# Patient Record
Sex: FEMALE | Race: WHITE | Employment: FULL TIME | URBAN - METROPOLITAN AREA
[De-identification: names, ages, dates, MRNs, and addresses within clinical notes are randomized per-mention and may not be internally consistent; named-entity substitution may affect disease eponyms.]

---

## 2017-07-20 PROCEDURE — 83876 ASSAY MYELOPEROXIDASE: CPT | Performed by: INTERNAL MEDICINE

## 2017-07-20 PROCEDURE — 86162 COMPLEMENT TOTAL (CH50): CPT | Performed by: INTERNAL MEDICINE

## 2017-07-20 PROCEDURE — 86160 COMPLEMENT ANTIGEN: CPT | Performed by: INTERNAL MEDICINE

## 2017-09-18 ENCOUNTER — LAB ENCOUNTER (OUTPATIENT)
Dept: LAB | Age: 57
End: 2017-09-18
Attending: ALLERGY & IMMUNOLOGY
Payer: COMMERCIAL

## 2017-09-18 DIAGNOSIS — E11.9 DIABETES MELLITUS (HCC): Primary | ICD-10-CM

## 2017-09-18 DIAGNOSIS — T78.40XA ALLERGIC: ICD-10-CM

## 2017-09-18 LAB
ALBUMIN SERPL-MCNC: 3.8 G/DL (ref 3.5–4.8)
ALP LIVER SERPL-CCNC: 96 U/L (ref 46–118)
ALT SERPL-CCNC: 34 U/L (ref 14–54)
AST SERPL-CCNC: 32 U/L (ref 15–41)
BILIRUB SERPL-MCNC: 0.6 MG/DL (ref 0.1–2)
BUN BLD-MCNC: 24 MG/DL (ref 8–20)
CALCIUM BLD-MCNC: 9 MG/DL (ref 8.3–10.3)
CHLORIDE: 106 MMOL/L (ref 101–111)
CHOLEST SMN-MCNC: 180 MG/DL (ref ?–200)
CO2: 25 MMOL/L (ref 22–32)
CREAT BLD-MCNC: 0.74 MG/DL (ref 0.55–1.02)
CREAT UR-SCNC: 233 MG/DL
GLUCOSE BLD-MCNC: 123 MG/DL (ref 70–99)
HDLC SERPL-MCNC: 46 MG/DL (ref 45–?)
HDLC SERPL: 3.91 {RATIO} (ref ?–4.44)
IMMUNOGLOBULIN E: 129 IU/ML (ref 3.6–114)
LDLC SERPL CALC-MCNC: 95 MG/DL (ref ?–130)
LDLC SERPL-MCNC: 39 MG/DL (ref 5–40)
M PROTEIN MFR SERPL ELPH: 7.5 G/DL (ref 6.1–8.3)
MICROALBUMIN UR-MCNC: 1.72 MG/DL
MICROALBUMIN/CREAT 24H UR-RTO: 7.4 UG/MG (ref ?–30)
NONHDLC SERPL-MCNC: 134 MG/DL (ref ?–130)
POTASSIUM SERPL-SCNC: 3.8 MMOL/L (ref 3.6–5.1)
SODIUM SERPL-SCNC: 141 MMOL/L (ref 136–144)
TRIGLYCERIDES: 195 MG/DL (ref ?–150)

## 2017-09-18 PROCEDURE — 86003 ALLG SPEC IGE CRUDE XTRC EA: CPT

## 2017-09-18 PROCEDURE — 82043 UR ALBUMIN QUANTITATIVE: CPT

## 2017-09-18 PROCEDURE — 80053 COMPREHEN METABOLIC PANEL: CPT

## 2017-09-18 PROCEDURE — 82785 ASSAY OF IGE: CPT

## 2017-09-18 PROCEDURE — 80061 LIPID PANEL: CPT

## 2017-09-18 PROCEDURE — 82570 ASSAY OF URINE CREATININE: CPT

## 2017-09-19 LAB
Lab: <0.35 KU/L
Lab: <0.35 KU/L

## 2017-09-20 LAB
ALLERGEN,  IGE: <0.1 KU/L
ALLERGEN,  TREE IGE: <0.1 KU/L
ALLERGEN, A.ALTERNATA(TENUIS): <0.1 KU/L
ALLERGEN, AMERICAN COCKROACH: <0.1 KU/L
ALLERGEN, ANIMAL, GUINEA PIG EPI IGE: 0.81 KU/L
ALLERGEN, BOX ELDER/MAPLE IGE: <0.1 KU/L
ALLERGEN, CAT DANDER IGE: 35.3 KU/L
ALLERGEN, CHICKEN FEATHERS IGE: <0.1 KU/L
ALLERGEN, COTTONWOOD IGE: <0.1 KU/L
ALLERGEN, D. FARINAE IGE: <0.1 KU/L
ALLERGEN, D.PTERONYSSINUS IGE: <0.1 KU/L
ALLERGEN, DOG DANDER IGE: 4.2 KU/L
ALLERGEN, GIANT RAGWEED IGE: <0.1 KU/L
ALLERGEN, GOOSE FEATHERS IGE: <0.1 KU/L
ALLERGEN, HAMSTER EPIT IGE: <0.1 KU/L
ALLERGEN, HONEYBEE VENOM IGE: <0.1 KU/L
ALLERGEN, HORMODENDRUM IGE: <0.1 KU/L
ALLERGEN, JUNE/KENTUCKY GRASS: <0.1 KU/L
ALLERGEN, MUCOR RACEMOSUS IGE: <0.1 KU/L
ALLERGEN, OAK TREE IGE: <0.1 KU/L
ALLERGEN, PAPER WASP VENOM IGE: <0.1 KU/L
ALLERGEN, PENICILLIUM NOTATUM: <0.1 KU/L
ALLERGEN, RABBIT EPITH IGE: 0.21 KU/L
ALLERGEN, RUSSIAN THISTLE IGE: <0.1 KU/L
ALLERGEN, SHORT RAGWEED IGE: <0.1 KU/L
ALLERGEN, TIMOTHY GRASS IGE: <0.1 KU/L
ALLERGEN, WHITE-FACED HORNET: <0.1 KU/L
ALLERGEN, YELLOW JACKET VENOM: <0.1 KU/L
ALLERGEN, YELLOW-FACED HORNET: <0.1 KU/L
ALLERGEN,ASPERGILLUS FUMIGATUS: <0.1 KU/L
COCKLEBUR,IGE: <0.1 KU/L
HORSE DANDER, IGE: 1.84 KU/L
Lab: 0.11 KU/L
Lab: <0.1 KU/L

## 2017-09-21 LAB — ALLERGEN, PIGWEED IGE: <0.1 KU/L

## 2017-09-27 LAB — ALLERGEN, DUCK FEATHER IGE: <0.1 KU/L

## 2018-03-17 PROCEDURE — 81001 URINALYSIS AUTO W/SCOPE: CPT | Performed by: FAMILY MEDICINE

## 2018-08-31 PROCEDURE — 81001 URINALYSIS AUTO W/SCOPE: CPT | Performed by: FAMILY MEDICINE

## 2019-09-26 PROCEDURE — 81001 URINALYSIS AUTO W/SCOPE: CPT | Performed by: FAMILY MEDICINE

## 2021-03-02 ENCOUNTER — APPOINTMENT (OUTPATIENT)
Dept: CT IMAGING | Age: 61
End: 2021-03-02
Attending: INTERNAL MEDICINE

## 2024-07-09 ENCOUNTER — OFF PREMISE (OUTPATIENT)
Dept: ADMINISTRATIVE | Age: 64
End: 2024-07-09

## 2024-07-10 RX ORDER — CETIRIZINE HYDROCHLORIDE 5 MG/1
5 TABLET ORAL DAILY
COMMUNITY

## 2024-07-10 RX ORDER — LEVOTHYROXINE SODIUM 0.2 MG/1
300 TABLET ORAL
COMMUNITY

## 2024-07-15 ENCOUNTER — HOSPITAL ENCOUNTER (OUTPATIENT)
Dept: PHYSICAL THERAPY | Facility: HOSPITAL | Age: 64
Discharge: HOME | End: 2024-07-15
Attending: ORTHOPAEDIC SURGERY
Payer: COMMERCIAL

## 2024-07-15 ENCOUNTER — LABORATORY ENCOUNTER (OUTPATIENT)
Dept: LAB | Age: 64
End: 2024-07-15
Attending: ORTHOPAEDIC SURGERY
Payer: COMMERCIAL

## 2024-07-15 DIAGNOSIS — M17.12 PRIMARY OSTEOARTHRITIS OF LEFT KNEE: ICD-10-CM

## 2024-07-15 LAB
ANION GAP SERPL CALC-SCNC: 9 MMOL/L (ref 0–18)
ANTIBODY SCREEN: NEGATIVE
BUN BLD-MCNC: 12 MG/DL (ref 9–23)
BUN/CREAT SERPL: 14.5 (ref 10–20)
CALCIUM BLD-MCNC: 9.8 MG/DL (ref 8.7–10.4)
CHLORIDE SERPL-SCNC: 106 MMOL/L (ref 98–112)
CO2 SERPL-SCNC: 28 MMOL/L (ref 21–32)
CREAT BLD-MCNC: 0.83 MG/DL
DEPRECATED RDW RBC AUTO: 50.7 FL (ref 35.1–46.3)
EGFRCR SERPLBLD CKD-EPI 2021: 79 ML/MIN/1.73M2 (ref 60–?)
ERYTHROCYTE [DISTWIDTH] IN BLOOD BY AUTOMATED COUNT: 16.7 % (ref 11–15)
FASTING STATUS PATIENT QL REPORTED: YES
GLUCOSE BLD-MCNC: 168 MG/DL (ref 70–99)
HCT VFR BLD AUTO: 36.6 %
HGB BLD-MCNC: 11.5 G/DL
MCH RBC QN AUTO: 26.1 PG (ref 26–34)
MCHC RBC AUTO-ENTMCNC: 31.4 G/DL (ref 31–37)
MCV RBC AUTO: 83.2 FL
OSMOLALITY SERPL CALC.SUM OF ELEC: 300 MOSM/KG (ref 275–295)
PLATELET # BLD AUTO: 254 10(3)UL (ref 150–450)
POTASSIUM SERPL-SCNC: 3.8 MMOL/L (ref 3.5–5.1)
RBC # BLD AUTO: 4.4 X10(6)UL
RH BLOOD TYPE: POSITIVE
SODIUM SERPL-SCNC: 143 MMOL/L (ref 136–145)
WBC # BLD AUTO: 9.6 X10(3) UL (ref 4–11)

## 2024-07-15 PROCEDURE — 86900 BLOOD TYPING SEROLOGIC ABO: CPT

## 2024-07-15 PROCEDURE — 36415 COLL VENOUS BLD VENIPUNCTURE: CPT

## 2024-07-15 PROCEDURE — 87081 CULTURE SCREEN ONLY: CPT

## 2024-07-15 PROCEDURE — 80048 BASIC METABOLIC PNL TOTAL CA: CPT

## 2024-07-15 PROCEDURE — 86901 BLOOD TYPING SEROLOGIC RH(D): CPT

## 2024-07-15 PROCEDURE — 86850 RBC ANTIBODY SCREEN: CPT

## 2024-07-15 PROCEDURE — 85027 COMPLETE CBC AUTOMATED: CPT

## 2024-07-15 PROCEDURE — 87147 CULTURE TYPE IMMUNOLOGIC: CPT

## 2024-07-31 NOTE — H&P (VIEW-ONLY)
Patient ID: Tosha Dunham     Chief Complaint:    Patient presents with:  Left Knee - Pre-Op       HPI:    Tosha Dunham is a 63 year old female who presents today for evaluation of their left knee pain. Previously seen Dr. Floyd who obtained an MRI of the knee. She has attempted treatment with oral OTC antiinflammatory medication, oral prescription antiinflammatory medication, tylenol, physical therapy, home exercise regimen, gel injection and corticosteroid injection. Patient states pain started >1 yr ago. Pain is constant and described as aching, burning, sharp, shooting, and throbbing, is worse with weight bearing. Pain is located in the medial portion. Factors that aggravate symptoms include activity, stair climbing, weight bearing. Patient denies numbness, tingling, or radicular symptoms. It has been progressive in nature but remains intermittent.  Worsened by prolonged standing or walking and squatting activities. The patient complains of occasional swelling.  The patient complains of slowly becoming more bow-legged.  The patient has pain with ambulation and stair climbing, they often walk with a limp.    Social History    Socioeconomic History      Marital status: Single      Spouse name: Not on file      Number of children: Not on file      Years of education: Not on file      Highest education level: Not on file    Occupational History      Not on file    Tobacco Use      Smoking status: Never      Smokeless tobacco: Never    Vaping Use      Vaping status: Never Used    Substance and Sexual Activity      Alcohol use: No        Alcohol/week: 0.0 standard drinks of alcohol      Drug use: No      Sexual activity: Not Currently    Other Topics      Concerns:        Not on file    Social History Narrative      Not on file    Social Determinants of Health  Financial Resource Strain: Not on file  Food Insecurity: Not on file  Transportation Needs: Not on file  Physical Activity: Not on file  Stress: Not  on file  Social Connections: Not on file  Intimate Partner Violence: Not on file  Housing Stability: Not on file  Past Medical History:   Diagnosis Date   • Breast injury 03/25/2022    weed sarthak hit patients chest left breast 11-12:00, marked bruise   • Hyperlipidemia    • Hypertension    • Hypothyroidism (acquired)    • Insomnia    • Plantar fasciitis    • Type 2 diabetes mellitus (HCC)    • Vertigo      Family History   Problem Relation Age of Onset   • Lipids Mother    • Hypertension Mother    • Stroke Mother    • Thyroid disease Mother         Hypothyroidism on thyroxine   • Cancer Father         lung ca   • Heart Disorder Father         heart disease       ROS:      All other pertinent positives and negatives as noted above in HPI.     Physical Exam:     Vital Signs:  LMP 05/06/2007   Estimated body mass index is 30.79 kg/m² as calculated from the following:    Height as of 5/24/24: 5' 5\" (1.651 m).    Weight as of 7/25/24: 185 lb (83.9 kg).      Musculoskeletal:    left knee:  Painful gait with a subtle limp  No muscle atrophy, erythema, ecchymosis, or gross deformity noted  no knee effusion  + medial>lateral joint line tenderness  Active range of motion 2-115*  5/5 strength flexion and extension  The knee is stable to varus and valgus stress testing  mild varus alignment of the limb  Lachman negative  Posterior drawer negative  Hilaria's negative  Patellofemoral grind +  Sensation grossly intact to light tough throughout the lower extremity  Skin is intact  Distal pulses are 2+  No signs or symptoms of DVT    Exam of the contralateral knee is normal:  No muscle atrophy, erythema, ecchymosis, or gross deformity noted  No knee effusion  No medial or lateral joint line tenderness  Full active range of motion  5/5 strength flexion and extension  The knee is stable to varus and valgus stress testing  Lachman negative  Posterior drawer negative  Hilaria's negative  Patellofemoral grind negative  Sensation  grossly intact to light tough throughout the lower extremity  Skin is intact  Distal pulses are intact  No signs or symptoms of DVT    Bilateral Hip exam:  No atrophy, erythema, ecchymosis, or gross deformity noted  No tenderness to palpation  Slightly dimished active range of motion, mild lack of IR but nonpainful  5/5 strength in hip flexion, abduction, and adduction  Anterior, lateral, and posterior hip impingement tests negative  Stinchfield and straight leg raise negative  APOLINAR negative    Diagnostic Studies:     Imaging was personally and individually reviewed and discussed at length with the patient:    4 views of the left knee including bilateral AP standing, 45 degree   flexion, sunrise, and affected lateral demonstrate no acute fracture or   dislocation.  There are mild changes present to the Medial compartments   consistent with K-L Grade 1/2 degeneration. There is mild joint space   narrowing to the Medial compartments with minimal osteophyte burden. There   is appropriate patellar positioning on lateral imaging without evidence of   rayray or baja.     MRI knee reviewed:  IMPRESSION:     1. Focal surface tear of the medial meniscus at the posterior horn-body junction, with additional   fraying of the medial meniscus posterior horn at the free edge.   2. Osteoarthritis, with severe localized patellar chondromalacia. There is also moderate localized   cartilage thinning in the medial compartment.   3. Small to moderate knee joint effusion.     07/22/2024  Hgb: 11.5  Alb: 4.2  Cr: 0.83  GFR: 79  A1C: 7.3 (07/25/2024)  MRSA/MSSA: Negative        Assessment:     Left Knee Osteoarthritis - varus            Plan:     Based on x-rays, history, and office evaluation, we have diagnosed Tosha Dunham with left knee arthritis. At this time, they have failed a conservative treatment program. non steroid anti-infammatory medication(s), physical therapy, corticosteroid injection(s), and hyaluronic acid injection(s)  are no longer working.  Their symptoms are no longer relieved and have become more severe enough to significantly deteriorate their quality of life and affect their activities of daily living.  Their disability is significant enough to drastically alter their lifestyle.  Given the patient's symptoms and request, a left total knee arthroplasty is indicated.       The spectrum of treatment options were discussed with the patient in detail including both the nonoperative and operative treatment modalities and their respective risks and benefits.  After thorough discussion, the patient has elected to undergo surgical treatment.  The details of the surgical procedure were explained including the location of probable incisions and a description of the likely implants to be used.  Models and diagrams were used as educational resources. The patient understands the likely convalescence after surgery, as well as the rehabilitation required.  We thoroughly discussed the risks, benefits, and alternatives to surgery.  The risks include but are not limited to the risk of infection, joint stiffness, blood clots (including DVT and/or pulmonary embolus along with the risk of death), neurologic and/or vascular injury, fracture, dislocation, nonunion, malunion, need for further surgery including hardware failure requiring revision, and continued pain.  It was explained that if tissue has been repaired or reconstructed, there is also a chance of failure which may require further management.  In addition, we have discussed the risks, including the risk of disease transmission, associated with any allograft tissue which may be utilized.  Following the completion of the discussion, the patient expressed understanding of this planned course of care, all their questions were answered and consent will be obtained preoperatively.    Primary left TKA at [facility] (Obs) - 08/05/2024      no smoking  yes diabetes -  Last A1c value was 7.3% done  7/25/2024.  BMI - 30  yes OSCAR - does not use cpap  no hx of infections/MRSA  no hx of blood clots   no blood thinner  last injection 5/2/24  No cardiac or pulmonary issues       - risks, benefits and alternatives discussed  - labs reviewed and medications sent to pharmacy  - proceed with left total knee arthroplasty as planned      Plan reviewed with Dr. Lakia Arredondo, [facility]          Diagnoses and all orders for this visit:    Primary osteoarthritis of left knee    S/P total knee arthroplasty, left  -     PT AND/OR OT EVAL & TREAT (DULY); Standing  -     acetaminophen 325 MG Oral Tab; Take 2 tablets (650 mg total) by mouth every 4 (four) hours for 14 days. Ok to take over the counter and ok to continue past 2 weeks. Do not exceed 4,000 mg per day.  -     aspirin 81 MG Oral Tab EC; Take 1 tablet (81 mg total) by mouth 2 (two) times daily. To take for 6 weeks total for blood clot prevention.  -     celecoxib (CELEBREX) 200 MG Oral Cap; Take 1 capsule (200 mg total) by mouth daily. Take 1 capsule (200 mg total) by mouth daily. Take with food.  -     OXYCODONE 5 MG Oral Tab; Take 1 tablet (5 mg total) by mouth every 4 (four) hours as needed for Pain.  -     senna-docusate 8.6-50 MG Oral Tab; Take 1 tablet by mouth daily. Take daily while taking narcotics regularly for pain.  -     traMADol 50 MG Oral Tab; Take 1 tablet (50 mg total) by mouth every 4 to 6 hours as needed for Pain (Can alternate with Oxycodone). Do not take along with oxycodone or norco (hydrocodone) - separate by 2 hours if needed

## 2024-08-04 ENCOUNTER — ANESTHESIA EVENT (OUTPATIENT)
Dept: SURGERY | Facility: HOSPITAL | Age: 64
End: 2024-08-04
Payer: COMMERCIAL

## 2024-08-05 ENCOUNTER — APPOINTMENT (OUTPATIENT)
Dept: GENERAL RADIOLOGY | Facility: HOSPITAL | Age: 64
End: 2024-08-05
Attending: ORTHOPAEDIC SURGERY
Payer: COMMERCIAL

## 2024-08-05 ENCOUNTER — HOSPITAL ENCOUNTER (OUTPATIENT)
Facility: HOSPITAL | Age: 64
Discharge: HOME HEALTH | End: 2024-08-06
Attending: ORTHOPAEDIC SURGERY | Admitting: ORTHOPAEDIC SURGERY
Payer: COMMERCIAL

## 2024-08-05 ENCOUNTER — ANESTHESIA (OUTPATIENT)
Dept: SURGERY | Facility: HOSPITAL | Age: 64
End: 2024-08-05
Payer: COMMERCIAL

## 2024-08-05 DIAGNOSIS — Z96.652 S/P TOTAL KNEE ARTHROPLASTY, LEFT: ICD-10-CM

## 2024-08-05 DIAGNOSIS — M17.12 PRIMARY OSTEOARTHRITIS OF LEFT KNEE: Primary | ICD-10-CM

## 2024-08-05 LAB
GLUCOSE BLD-MCNC: 151 MG/DL (ref 70–99)
GLUCOSE BLD-MCNC: 229 MG/DL (ref 70–99)
GLUCOSE BLD-MCNC: 265 MG/DL (ref 70–99)
RH BLOOD TYPE: POSITIVE

## 2024-08-05 PROCEDURE — 73560 X-RAY EXAM OF KNEE 1 OR 2: CPT | Performed by: ORTHOPAEDIC SURGERY

## 2024-08-05 PROCEDURE — 76942 ECHO GUIDE FOR BIOPSY: CPT | Performed by: STUDENT IN AN ORGANIZED HEALTH CARE EDUCATION/TRAINING PROGRAM

## 2024-08-05 PROCEDURE — 88305 TISSUE EXAM BY PATHOLOGIST: CPT | Performed by: ORTHOPAEDIC SURGERY

## 2024-08-05 PROCEDURE — 88311 DECALCIFY TISSUE: CPT | Performed by: ORTHOPAEDIC SURGERY

## 2024-08-05 PROCEDURE — 82962 GLUCOSE BLOOD TEST: CPT

## 2024-08-05 PROCEDURE — 0SRD0J9 REPLACEMENT OF LEFT KNEE JOINT WITH SYNTHETIC SUBSTITUTE, CEMENTED, OPEN APPROACH: ICD-10-PCS | Performed by: ORTHOPAEDIC SURGERY

## 2024-08-05 DEVICE — IMPLANTABLE DEVICE
Type: IMPLANTABLE DEVICE | Site: KNEE | Status: FUNCTIONAL
Brand: PERSONA® VIVACIT-E®

## 2024-08-05 DEVICE — IMPLANTABLE DEVICE
Type: IMPLANTABLE DEVICE | Site: KNEE | Status: FUNCTIONAL
Brand: PERSONA®

## 2024-08-05 DEVICE — IMPLANTABLE DEVICE
Type: IMPLANTABLE DEVICE | Site: KNEE | Status: FUNCTIONAL
Brand: REFOBACIN® BONE CEMENT R

## 2024-08-05 DEVICE — IMPLANTABLE DEVICE
Type: IMPLANTABLE DEVICE | Site: KNEE | Status: FUNCTIONAL
Brand: PERSONA® NATURAL TIBIA®

## 2024-08-05 RX ORDER — ASPIRIN 81 MG/1
TABLET ORAL
COMMUNITY
Start: 2024-07-31

## 2024-08-05 RX ORDER — ONDANSETRON 2 MG/ML
4 INJECTION INTRAMUSCULAR; INTRAVENOUS EVERY 6 HOURS PRN
Status: DISCONTINUED | OUTPATIENT
Start: 2024-08-05 | End: 2024-08-06

## 2024-08-05 RX ORDER — ESCITALOPRAM OXALATE 20 MG/1
20 TABLET ORAL DAILY
Status: DISCONTINUED | OUTPATIENT
Start: 2024-08-06 | End: 2024-08-06

## 2024-08-05 RX ORDER — OXYCODONE HYDROCHLORIDE 5 MG/1
5 TABLET ORAL EVERY 4 HOURS PRN
Status: DISCONTINUED | OUTPATIENT
Start: 2024-08-05 | End: 2024-08-06

## 2024-08-05 RX ORDER — NICOTINE POLACRILEX 4 MG
30 LOZENGE BUCCAL
Status: DISCONTINUED | OUTPATIENT
Start: 2024-08-05 | End: 2024-08-05 | Stop reason: HOSPADM

## 2024-08-05 RX ORDER — AMOXICILLIN 250 MG
1 CAPSULE ORAL DAILY
COMMUNITY
Start: 2024-07-31

## 2024-08-05 RX ORDER — BUPIVACAINE HYDROCHLORIDE 5 MG/ML
INJECTION, SOLUTION EPIDURAL; INTRACAUDAL AS NEEDED
Status: DISCONTINUED | OUTPATIENT
Start: 2024-08-05 | End: 2024-08-05

## 2024-08-05 RX ORDER — SODIUM CHLORIDE, SODIUM LACTATE, POTASSIUM CHLORIDE, CALCIUM CHLORIDE 600; 310; 30; 20 MG/100ML; MG/100ML; MG/100ML; MG/100ML
INJECTION, SOLUTION INTRAVENOUS CONTINUOUS
Status: DISCONTINUED | OUTPATIENT
Start: 2024-08-05 | End: 2024-08-06

## 2024-08-05 RX ORDER — PROCHLORPERAZINE EDISYLATE 5 MG/ML
5 INJECTION INTRAMUSCULAR; INTRAVENOUS EVERY 8 HOURS PRN
Status: DISCONTINUED | OUTPATIENT
Start: 2024-08-05 | End: 2024-08-05 | Stop reason: HOSPADM

## 2024-08-05 RX ORDER — MIDAZOLAM HYDROCHLORIDE 1 MG/ML
INJECTION INTRAMUSCULAR; INTRAVENOUS AS NEEDED
Status: DISCONTINUED | OUTPATIENT
Start: 2024-08-05 | End: 2024-08-05 | Stop reason: SURG

## 2024-08-05 RX ORDER — DEXTROSE MONOHYDRATE 25 G/50ML
50 INJECTION, SOLUTION INTRAVENOUS
Status: DISCONTINUED | OUTPATIENT
Start: 2024-08-05 | End: 2024-08-06

## 2024-08-05 RX ORDER — ONDANSETRON 2 MG/ML
4 INJECTION INTRAMUSCULAR; INTRAVENOUS EVERY 6 HOURS PRN
Status: DISCONTINUED | OUTPATIENT
Start: 2024-08-05 | End: 2024-08-05 | Stop reason: HOSPADM

## 2024-08-05 RX ORDER — SCOLOPAMINE TRANSDERMAL SYSTEM 1 MG/1
1 PATCH, EXTENDED RELEASE TRANSDERMAL ONCE
Status: DISCONTINUED | OUTPATIENT
Start: 2024-08-05 | End: 2024-08-05 | Stop reason: HOSPADM

## 2024-08-05 RX ORDER — DIPHENHYDRAMINE HCL 25 MG
25 CAPSULE ORAL EVERY 4 HOURS PRN
Status: DISCONTINUED | OUTPATIENT
Start: 2024-08-05 | End: 2024-08-06

## 2024-08-05 RX ORDER — NICOTINE POLACRILEX 4 MG
15 LOZENGE BUCCAL
Status: DISCONTINUED | OUTPATIENT
Start: 2024-08-05 | End: 2024-08-06

## 2024-08-05 RX ORDER — ACETAMINOPHEN 500 MG
1000 TABLET ORAL ONCE
Status: DISCONTINUED | OUTPATIENT
Start: 2024-08-05 | End: 2024-08-05 | Stop reason: HOSPADM

## 2024-08-05 RX ORDER — LEVOTHYROXINE SODIUM 0.15 MG/1
300 TABLET ORAL
Status: DISCONTINUED | OUTPATIENT
Start: 2024-08-06 | End: 2024-08-06

## 2024-08-05 RX ORDER — PROCHLORPERAZINE EDISYLATE 5 MG/ML
5 INJECTION INTRAMUSCULAR; INTRAVENOUS EVERY 8 HOURS PRN
Status: DISCONTINUED | OUTPATIENT
Start: 2024-08-05 | End: 2024-08-06

## 2024-08-05 RX ORDER — NYSTATIN 100000 U/G
1 OINTMENT TOPICAL 2 TIMES DAILY
Status: ON HOLD | COMMUNITY
Start: 2024-07-16 | End: 2024-08-05 | Stop reason: CLARIF

## 2024-08-05 RX ORDER — NALOXONE HYDROCHLORIDE 0.4 MG/ML
0.08 INJECTION, SOLUTION INTRAMUSCULAR; INTRAVENOUS; SUBCUTANEOUS AS NEEDED
Status: DISCONTINUED | OUTPATIENT
Start: 2024-08-05 | End: 2024-08-05 | Stop reason: HOSPADM

## 2024-08-05 RX ORDER — OXYCODONE HYDROCHLORIDE 10 MG/1
10 TABLET ORAL EVERY 4 HOURS PRN
Status: DISCONTINUED | OUTPATIENT
Start: 2024-08-05 | End: 2024-08-06

## 2024-08-05 RX ORDER — MONTELUKAST SODIUM 10 MG/1
10 TABLET ORAL DAILY
Status: DISCONTINUED | OUTPATIENT
Start: 2024-08-06 | End: 2024-08-06

## 2024-08-05 RX ORDER — POLYETHYLENE GLYCOL 3350 17 G/17G
17 POWDER, FOR SOLUTION ORAL DAILY PRN
Status: DISCONTINUED | OUTPATIENT
Start: 2024-08-05 | End: 2024-08-06

## 2024-08-05 RX ORDER — PANTOPRAZOLE SODIUM 40 MG/1
40 TABLET, DELAYED RELEASE ORAL DAILY
Status: DISCONTINUED | OUTPATIENT
Start: 2024-08-05 | End: 2024-08-06

## 2024-08-05 RX ORDER — ATORVASTATIN CALCIUM 40 MG/1
40 TABLET, FILM COATED ORAL DAILY
Status: DISCONTINUED | OUTPATIENT
Start: 2024-08-05 | End: 2024-08-06

## 2024-08-05 RX ORDER — KETOROLAC TROMETHAMINE 30 MG/ML
15 INJECTION, SOLUTION INTRAMUSCULAR; INTRAVENOUS EVERY 6 HOURS
Status: COMPLETED | OUTPATIENT
Start: 2024-08-05 | End: 2024-08-06

## 2024-08-05 RX ORDER — DEXAMETHASONE SODIUM PHOSPHATE 10 MG/ML
8 INJECTION, SOLUTION INTRAMUSCULAR; INTRAVENOUS ONCE
Status: COMPLETED | OUTPATIENT
Start: 2024-08-06 | End: 2024-08-06

## 2024-08-05 RX ORDER — SENNOSIDES 8.6 MG
17.2 TABLET ORAL NIGHTLY
Status: DISCONTINUED | OUTPATIENT
Start: 2024-08-05 | End: 2024-08-06

## 2024-08-05 RX ORDER — CELECOXIB 200 MG/1
200 CAPSULE ORAL DAILY
COMMUNITY
Start: 2024-07-31

## 2024-08-05 RX ORDER — DIPHENHYDRAMINE HYDROCHLORIDE 50 MG/ML
25 INJECTION INTRAMUSCULAR; INTRAVENOUS ONCE AS NEEDED
Status: ACTIVE | OUTPATIENT
Start: 2024-08-05 | End: 2024-08-05

## 2024-08-05 RX ORDER — ACETAMINOPHEN 325 MG/1
TABLET ORAL
Status: COMPLETED
Start: 2024-08-05 | End: 2024-08-05

## 2024-08-05 RX ORDER — INSULIN ASPART 100 [IU]/ML
INJECTION, SOLUTION INTRAVENOUS; SUBCUTANEOUS ONCE
Status: COMPLETED | OUTPATIENT
Start: 2024-08-05 | End: 2024-08-05

## 2024-08-05 RX ORDER — HYDROMORPHONE HYDROCHLORIDE 1 MG/ML
0.4 INJECTION, SOLUTION INTRAMUSCULAR; INTRAVENOUS; SUBCUTANEOUS EVERY 5 MIN PRN
Status: DISCONTINUED | OUTPATIENT
Start: 2024-08-05 | End: 2024-08-05 | Stop reason: HOSPADM

## 2024-08-05 RX ORDER — GABAPENTIN 100 MG/1
100 CAPSULE ORAL NIGHTLY
Status: DISCONTINUED | OUTPATIENT
Start: 2024-08-05 | End: 2024-08-06

## 2024-08-05 RX ORDER — BISACODYL 10 MG
10 SUPPOSITORY, RECTAL RECTAL
Status: DISCONTINUED | OUTPATIENT
Start: 2024-08-05 | End: 2024-08-06

## 2024-08-05 RX ORDER — NYSTATIN 100000 [USP'U]/G
POWDER TOPICAL 2 TIMES DAILY
COMMUNITY

## 2024-08-05 RX ORDER — IBUPROFEN 600 MG/1
600 TABLET ORAL EVERY 6 HOURS SCHEDULED
Status: DISCONTINUED | OUTPATIENT
Start: 2024-08-06 | End: 2024-08-06

## 2024-08-05 RX ORDER — NICOTINE POLACRILEX 4 MG
15 LOZENGE BUCCAL
Status: DISCONTINUED | OUTPATIENT
Start: 2024-08-05 | End: 2024-08-05 | Stop reason: HOSPADM

## 2024-08-05 RX ORDER — FERROUS SULFATE 325(65) MG
325 TABLET, DELAYED RELEASE (ENTERIC COATED) ORAL
Status: DISCONTINUED | OUTPATIENT
Start: 2024-08-06 | End: 2024-08-06

## 2024-08-05 RX ORDER — ASPIRIN 81 MG/1
81 TABLET ORAL 2 TIMES DAILY
Status: DISCONTINUED | OUTPATIENT
Start: 2024-08-05 | End: 2024-08-06

## 2024-08-05 RX ORDER — HYDROMORPHONE HYDROCHLORIDE 1 MG/ML
0.8 INJECTION, SOLUTION INTRAMUSCULAR; INTRAVENOUS; SUBCUTANEOUS EVERY 2 HOUR PRN
Status: DISCONTINUED | OUTPATIENT
Start: 2024-08-05 | End: 2024-08-06

## 2024-08-05 RX ORDER — DOCUSATE SODIUM 100 MG/1
100 CAPSULE, LIQUID FILLED ORAL 2 TIMES DAILY
Status: DISCONTINUED | OUTPATIENT
Start: 2024-08-05 | End: 2024-08-06

## 2024-08-05 RX ORDER — NICOTINE POLACRILEX 4 MG
30 LOZENGE BUCCAL
Status: DISCONTINUED | OUTPATIENT
Start: 2024-08-05 | End: 2024-08-06

## 2024-08-05 RX ORDER — ACETAMINOPHEN 325 MG/1
650 TABLET ORAL ONCE
Status: COMPLETED | OUTPATIENT
Start: 2024-08-05 | End: 2024-08-05

## 2024-08-05 RX ORDER — TRANEXAMIC ACID 10 MG/ML
1000 INJECTION, SOLUTION INTRAVENOUS ONCE
Status: COMPLETED | OUTPATIENT
Start: 2024-08-05 | End: 2024-08-05

## 2024-08-05 RX ORDER — INSULIN ASPART 100 [IU]/ML
INJECTION, SOLUTION INTRAVENOUS; SUBCUTANEOUS
Status: COMPLETED
Start: 2024-08-05 | End: 2024-08-05

## 2024-08-05 RX ORDER — ENEMA 19; 7 G/133ML; G/133ML
1 ENEMA RECTAL ONCE AS NEEDED
Status: DISCONTINUED | OUTPATIENT
Start: 2024-08-05 | End: 2024-08-06

## 2024-08-05 RX ORDER — ONDANSETRON 2 MG/ML
INJECTION INTRAMUSCULAR; INTRAVENOUS AS NEEDED
Status: DISCONTINUED | OUTPATIENT
Start: 2024-08-05 | End: 2024-08-05 | Stop reason: SURG

## 2024-08-05 RX ORDER — HYDROMORPHONE HYDROCHLORIDE 1 MG/ML
0.4 INJECTION, SOLUTION INTRAMUSCULAR; INTRAVENOUS; SUBCUTANEOUS EVERY 2 HOUR PRN
Status: DISCONTINUED | OUTPATIENT
Start: 2024-08-05 | End: 2024-08-06

## 2024-08-05 RX ORDER — HYDROMORPHONE HYDROCHLORIDE 1 MG/ML
0.2 INJECTION, SOLUTION INTRAMUSCULAR; INTRAVENOUS; SUBCUTANEOUS EVERY 5 MIN PRN
Status: DISCONTINUED | OUTPATIENT
Start: 2024-08-05 | End: 2024-08-05 | Stop reason: HOSPADM

## 2024-08-05 RX ORDER — DEXTROSE MONOHYDRATE 25 G/50ML
50 INJECTION, SOLUTION INTRAVENOUS
Status: DISCONTINUED | OUTPATIENT
Start: 2024-08-05 | End: 2024-08-05 | Stop reason: HOSPADM

## 2024-08-05 RX ORDER — OXYCODONE HYDROCHLORIDE 5 MG/1
1 TABLET ORAL EVERY 4 HOURS PRN
COMMUNITY
Start: 2024-07-31

## 2024-08-05 RX ORDER — BUPIVACAINE HYDROCHLORIDE 5 MG/ML
INJECTION, SOLUTION EPIDURAL; INTRACAUDAL AS NEEDED
Status: DISCONTINUED | OUTPATIENT
Start: 2024-08-05 | End: 2024-08-05 | Stop reason: SURG

## 2024-08-05 RX ORDER — FAMOTIDINE 10 MG/ML
20 INJECTION, SOLUTION INTRAVENOUS 2 TIMES DAILY
Status: DISCONTINUED | OUTPATIENT
Start: 2024-08-05 | End: 2024-08-06

## 2024-08-05 RX ORDER — DIPHENHYDRAMINE HYDROCHLORIDE 50 MG/ML
12.5 INJECTION INTRAMUSCULAR; INTRAVENOUS EVERY 4 HOURS PRN
Status: DISCONTINUED | OUTPATIENT
Start: 2024-08-05 | End: 2024-08-06

## 2024-08-05 RX ORDER — CLONIDINE 100 UG/ML
INJECTION, SOLUTION EPIDURAL AS NEEDED
Status: DISCONTINUED | OUTPATIENT
Start: 2024-08-05 | End: 2024-08-05 | Stop reason: SURG

## 2024-08-05 RX ORDER — HYDROMORPHONE HYDROCHLORIDE 1 MG/ML
0.6 INJECTION, SOLUTION INTRAMUSCULAR; INTRAVENOUS; SUBCUTANEOUS EVERY 5 MIN PRN
Status: DISCONTINUED | OUTPATIENT
Start: 2024-08-05 | End: 2024-08-05 | Stop reason: HOSPADM

## 2024-08-05 RX ORDER — TIZANIDINE 2 MG/1
2 TABLET ORAL EVERY 6 HOURS PRN
Status: DISCONTINUED | OUTPATIENT
Start: 2024-08-05 | End: 2024-08-06

## 2024-08-05 RX ORDER — FAMOTIDINE 20 MG/1
20 TABLET, FILM COATED ORAL 2 TIMES DAILY
Status: DISCONTINUED | OUTPATIENT
Start: 2024-08-05 | End: 2024-08-06

## 2024-08-05 RX ORDER — DEXAMETHASONE SODIUM PHOSPHATE 4 MG/ML
VIAL (ML) INJECTION AS NEEDED
Status: DISCONTINUED | OUTPATIENT
Start: 2024-08-05 | End: 2024-08-05 | Stop reason: SURG

## 2024-08-05 RX ORDER — SODIUM CHLORIDE, SODIUM LACTATE, POTASSIUM CHLORIDE, CALCIUM CHLORIDE 600; 310; 30; 20 MG/100ML; MG/100ML; MG/100ML; MG/100ML
INJECTION, SOLUTION INTRAVENOUS CONTINUOUS
Status: DISCONTINUED | OUTPATIENT
Start: 2024-08-05 | End: 2024-08-05 | Stop reason: HOSPADM

## 2024-08-05 RX ADMIN — TRANEXAMIC ACID 1000 MG: 10 INJECTION, SOLUTION INTRAVENOUS at 08:48:00

## 2024-08-05 RX ADMIN — MIDAZOLAM HYDROCHLORIDE 2 MG: 1 INJECTION INTRAMUSCULAR; INTRAVENOUS at 08:41:00

## 2024-08-05 RX ADMIN — ONDANSETRON 4 MG: 2 INJECTION INTRAMUSCULAR; INTRAVENOUS at 10:05:00

## 2024-08-05 RX ADMIN — CLONIDINE 50 MCG: 100 INJECTION, SOLUTION EPIDURAL at 08:45:00

## 2024-08-05 RX ADMIN — TRANEXAMIC ACID: 10 INJECTION, SOLUTION INTRAVENOUS at 10:41:00

## 2024-08-05 RX ADMIN — DEXAMETHASONE SODIUM PHOSPHATE 4 MG: 4 MG/ML VIAL (ML) INJECTION at 08:48:00

## 2024-08-05 RX ADMIN — BUPIVACAINE HYDROCHLORIDE 20 ML: 5 INJECTION, SOLUTION EPIDURAL; INTRACAUDAL at 08:45:00

## 2024-08-05 NOTE — OPERATIVE REPORT
OPERATIVE REPORT    Facility: [facility]  Patient name: Tosha Dunham  : 1960        Medical record: [id number]  Date of procedure: 2024  Pre-operative diagnosis: Left knee end-stage degenerative joint disease  Post-operative diagnosis: Same  Procedure performed: Left total knee arthroplasty  Implants: [employer] TKA   Femur: size 5 Persona  CR   Tibial tray: size D Persona    Patella - 32 mm   Bearing - 10 mm Medial Congruent  Surgeon: Maxwell Dorman M.D.   Assistant(s):  1. Sachin Arredondo, MSN, FNP-BC  2. Ari Shook SA  Anesthesia: Epidural/Adductor Canal Femoral Nerve Block  Estimated blood loss: 150 milliliters   Drains: none  Specimens: None  Complications: None apparent            INDICATIONS:  Tosha Dunham is a pleasant 64 year old year old who presented to my office with a long history of knee pain. The patient failed conservative therapy and we discussed surgical treatment options including total knee arthroplasty. Prior to surgery we discussed the risks, benefits, alternatives to surgery, and surgical convalescence.  Surgical consent was obtained both in the office, as well as the day of surgery. Risks of the procedure include, but are not limited to, infection, DVT, PE, damage to nerves or blood vessels at the time of surgery, bleeding and blood loss, stiffness/arthrofibrosis, and risk of loosening or failure of the components requiring revision surgery. The patient expressed understanding and wished to proceed with the surgery. An informed consent form was signed and placed on the chart prior to coming to the Operating Room.       PROCEDURE: The patient was brought to the operating room and once obtaining satisfactory anesthesia was placed in the supine position. The knee was prepped and draped in the usual sterile fashion for a total knee replacement.     A tourniquet was in the room but not applied/inflated during the procedure in an effort to diminish  quadriceps pain/dysfunction post operatively.     A surgical timeout was held verifying the site, procedure, and that pre-operative antibiotics had been given.  A longitudinal incision was over the anterior aspect of the knee exposing the extensor mechanism. An arthrotomy was performed and the patella displaced laterally. The gross pathologic findings revealed severe degenerative arthritis.    Subperiosteal dissection was continued around the proximal medial tibia. The necessary soft tissue release was performed to correct the fixed angular deformity. Care was taken to protect and preserve the medial collateral ligament.       Following this, the drill was used to open the femoral canal. After over drilling the canal, the intramedullary femoral guide was seated and the distal femur was resected at the stated valgus angle.     The extramedullary tibial cutting guide was then placed and the proximal tibia was resected at the appropriate level perpendicular to the long axis of the tibia.    The epicondylar axis and AP axis were determined.  The femoral sizing guide was set at the appropriate degree of external rotation.  The femur was then sized and the appropriate component selected. The anterior and posterior condyles were then resected with the appropriately sized guide and oscillating saw.    With the knee at 90 degrees of flexion, laminar spacers were placed between the femur and tibia. The anterior cruciate ligament was excised. The posterior cruciate ligament was left intact.  A medial and lateral meniscectomy were performed. A mixture of 30cc of 0.5% ropivicaine, 10mg Morphine, 30mg toradol, 0.5mg of epinephrine, clonidine 1mL were injected into the knee joint capsule posteriorly while the lamina spreaders were in position and at the capsulotomy incision sites prior to closure for local anesthesia.     The flexion and extension gaps were checked with the appropriate sized spacer and noted to be well balanced.  At this time, the tibial cut was checked with the spacer block and the alignment lise.    The distal end of the femur was sculptured with the notch and chamfer cutting guide using an oscillating saw. The tibial fixation hole was created with the tibial template and broach.     The synovium was excised from around the patella, after which the patella thickness was measured with calipers.  The patella was then prepared with the patellar reaming system.  The appropriate sized patellar template was seated and the three fixation holes were created with a drill.      A bone plug was then shaped and place in the opening created for the intramedullary femoral guide.    The trial components were then placed and the knee was found to have proper alignment and was stable through a full range of flexion and extension. The trial components were removed and the bony surfaces were cleaned with pulsatile lavage and an antibiotic solution. The bone was then dried and the permanent components were cemented in a sequential fashion. The tibial component was cemented first Set in the proper position and rotation.  The tibial component was impacted into place, it was fully seated and excess cemented was removed.  The femoral component was then cemented in place followed by the patella. Excess cement was removed.     Once the cement was hardened the tibial articular surface was implanted. The knee was then reduced and found to have good flexion, full extension with good stability and proper alignment. The patella tracked central.    A dilute (0.35%) betadine lavage was placed in the arthrotomy site to soak the components for 3 minutes prior to wound closure. The solution was made by adding 17.5 ml of 10% povidone-iodine in 500 cc of normal saline, resulting in a 0.35% dilution. This solution was then removed from the knee and the knee was copiously irrigated.    Hemostasis was obtained with electrocautery. The knee irrigated with pulsatile  lavage and antibiotic solution followed by normal saline.     The arthrotomy was closed with #1 stratafix barbed suture. The subcutaneous tissue was closed in layers with # 0 and # 2-0 Vicryl interrupted sutures. The skin was closed with 3-0 stratfix barbed sutures and dermabond. A sterile compressive dressing was applied. The patient tolerated the procedure well, without complication, and was transferred to the recovery room in a stable condition. The sponge and instrument count was correct.      A surgical first assistant was required and necessary for the completion of this case to aid in manipulation and positioning of the extremity while the surgeon operated.  Their assistance was vital to the safety and success of the procedure.     Deep venous thrombosis prophylaxis will consist of aspirin twice daily according to CHEST guidelines.             ____________________________  Maxwell Dorman M.D.

## 2024-08-05 NOTE — ADDENDUM NOTE
Addendum  created 08/05/24 1304 by Melchor Garcia DO    Clinical Note Signed, Intraprocedure Blocks edited, SmartForm saved

## 2024-08-05 NOTE — ANESTHESIA PROCEDURE NOTES
Spinal Block    Date/Time: 8/5/2024 8:35 AM    Performed by: Melchor Garcia DO  Authorized by: Melchor Garcia DO      General Information and Staff    Start Time:  8/5/2024 8:35 AM  End Time:  8/5/2024 8:41 AM  Anesthesiologist:  Melchor Garcia DO  Performed by:  Anesthesiologist  Site identification: surface landmarks    Preanesthetic Checklist: patient identified, IV checked, risks and benefits discussed, monitors and equipment checked, pre-op evaluation, timeout performed, anesthesia consent and sterile technique used      Procedure Details    Patient Position:  Sitting  Prep: ChloraPrep    Monitoring:  Cardiac monitor, heart rate and continuous pulse ox  Approach:  Midline  Location:  L3-4  Injection Technique:  Single-shot    Needle    Needle Type:  Sprotte  Needle Gauge:  24 G  Needle Length:  3.5 in    Assessment    Sensory Level:   Events: clear CSF, CSF aspirated, well tolerated and blood negative      Additional Comments

## 2024-08-05 NOTE — CM/SW NOTE
08/05/24 1500   CM/SW Referral Data   Referral Source Social Work (self-referral)   Reason for Referral Discharge planning   Informant EMR;Clinical Staff Member   Discharge Needs   Anticipated D/C needs Home health care       Patient is a 65 y/o woman admitted s/p TKR.  Pt with pre-operative plan for [facility] ([facility]  PT eval pending.  Referral sent to [facility] ([facility]) via [facility] by DSC.  Await confirmation of acceptance and therapy assessments for further DC planning.  / to remain available for support and/or discharge planning.     Zulay Malik, [facility]  Discharge Planner  [phone]

## 2024-08-05 NOTE — ANESTHESIA PREPROCEDURE EVALUATION
PRE-OP EVALUATION    Patient Name: Tosha Dunham    Admit Diagnosis: PRIMARY OSTEOARTHRITIS    Pre-op Diagnosis: PRIMARY OSTEOARTHRITIS    LEFT TOTAL KNEE ARTHROPLASTY    Anesthesia Procedure: LEFT TOTAL KNEE ARTHROPLASTY (Left)      HLD, HTN, hypothyroid  T2DM  vertigo    Surgeons and Role:     * Maxwell Dorman MD - Primary    Pre-op vitals reviewed.        Body mass index is 29.86 kg/m².    Current medications reviewed.  Hospital Medications:   [START ON 8/5/2024] povidone-iodine (Betadine) 10 % 17.5 mL in sodium chloride 0.9 % 500 mL irrigation   Irrigation Once (Intra-Op)    [START ON 8/5/2024] clonidine/epinephrine/ropivacaine/ketorolac in 0.9% NaCl 60 mL pain cocktail syringe for knee arthroplasty   Infiltration Once (Intra-Op)       Outpatient Medications:     No medications prior to admission.       Allergies: Patient has no known allergies.      Anesthesia Evaluation    Patient summary reviewed.    Anesthetic Complications  (+) history of anesthetic complications  History of: PONV       GI/Hepatic/Renal      (+) GERD                           Cardiovascular            MET: >4      (+) hypertension and well controlled  (+) hyperlipidemia                                  Endo/Other      (+) diabetes and well controlled, type 2,   (+) hypothyroidism                       Pulmonary                    (+) sleep apnea       Neuro/Psych  Comment: +Vertigo                                    Past Surgical History:   Procedure Laterality Date    Appendectomy      Cataract Bilateral 10/01/2020    D & c  2000    Other  2004    Broken wrist    Other  2004    Sinus sx    Other  2010    Plantar fascia    Tonsillectomy       Social History     Socioeconomic History    Marital status: Single   Tobacco Use    Smoking status: Never    Smokeless tobacco: Never   Vaping Use    Vaping status: Never Used   Substance and Sexual Activity    Alcohol use: No     Alcohol/week: 0.0 standard drinks of alcohol    Drug use: No     Sexual activity: Not Currently     History   Drug Use No     Available pre-op labs reviewed.  Lab Results   Component Value Date    WBC 9.6 07/15/2024    RBC 4.40 07/15/2024    HGB 11.5 (L) 07/15/2024    HCT 36.6 07/15/2024    MCV 83.2 07/15/2024    MCH 26.1 07/15/2024    MCHC 31.4 07/15/2024    RDW 16.7 (H) 07/15/2024    .0 07/15/2024     Lab Results   Component Value Date     07/15/2024    K 3.8 07/15/2024     07/15/2024    CO2 28.0 07/15/2024    BUN 12 07/15/2024    CREATSERUM 0.83 07/15/2024     (H) 07/15/2024    CA 9.8 07/15/2024            Airway      Mallampati: II  Mouth opening: 3 FB  TM distance: 4 - 6 cm  Neck ROM: full Cardiovascular    Cardiovascular exam normal.  Rhythm: regular       Dental             Pulmonary    Pulmonary exam normal.                 Other findings              ASA: 3   Plan: spinal  NPO status verified and     Post-procedure pain management plan discussed with surgeon and patient.  Surgeon requests: regional block    Plan/risks discussed with: patient  Use of blood product(s) discussed with: patient    Consented to blood products.          Present on Admission:  **None**    I spoke with the patient and discussed the risks of spinal anesthesia, which include bleeding, infection, headache, hypotension, nerve injury, allergic reaction, and failed attempts requiring conversion to general anesthesia. The patient understands and consents to receiving spinal anesthesia for this procedure.

## 2024-08-05 NOTE — H&P
[facility] Hospitalist History and Physical     PCP: Cassie Henriquez DO      Chief Complaint: post op knee     History of Present Illness: Patient is a 64 year old female with hx of DM, HTN, HLD, hypothyroidism, GERD here for scheduled surgery.  Pt is s/p LTKA on 8/5.  Post op pt feels well, no chest pain no SOB noted.  States knee pain is stable.      Current Meds  Scheduled Meds:    sennosides  17.2 mg Oral Nightly    docusate sodium  100 mg Oral BID    famotidine  20 mg Oral BID    Or    famotidine  20 mg Intravenous BID    [START ON 8/6/2024] ferrous sulfate  325 mg Oral Daily with breakfast    aspirin  81 mg Oral BID    ceFAZolin  2 g Intravenous Q8H    [START ON 8/6/2024] dexAMETHasone PF  8 mg Intravenous Once    ketorolac  15 mg Intravenous Q6H    Followed by    [START ON 8/6/2024] ibuprofen  600 mg Oral 4 times per day     Continuous Infusions:    lactated ringers Stopped (08/05/24 1041)     PRN Meds:   sodium chloride    polyethylene glycol (PEG 3350)    magnesium hydroxide    bisacodyl    fleet enema    ondansetron    prochlorperazine    diphenhydrAMINE    diphenhydrAMINE **OR** diphenhydrAMINE    tiZANidine    oxyCODONE **OR** oxyCODONE    HYDROmorphone **OR** HYDROmorphone    No Known Allergies     Past Medical History:    Breast injury    weed sarthak hit patients chest left breast 11-12:00, marked bruise    Cataract    Disorder of thyroid    Esophageal reflux    High blood pressure    High cholesterol    Insomnia    Osteoarthritis    Other and unspecified hyperlipidemia    Plantar fasciitis    PONV (postoperative nausea and vomiting)    Sleep apnea    no tx    Thyroid disease    Type II or unspecified type diabetes mellitus without mention of complication, not stated as uncontrolled    Unspecified essential hypertension    Vertigo      Past Surgical History:   Procedure Laterality Date    Appendectomy      Cataract Bilateral 10/01/2020    D & c  2000    Other  2004    Broken wrist    Other  2004    Sinus sx     Other  2010    Plantar fascia    Tonsillectomy        Social History     Tobacco Use    Smoking status: Never    Smokeless tobacco: Never   Substance Use Topics    Alcohol use: No     Alcohol/week: 0.0 standard drinks of alcohol      Family History   Problem Relation Age of Onset    Cancer Father         lung ca    Heart Disorder Father         heart disease    Lipids Mother     Hypertension Mother     Stroke Mother               Intake/Output:  No intake/output data recorded.  Wt Readings from Last 3 Encounters:   08/05/24 185 lb (83.9 kg)   11/11/21 176 lb (79.8 kg)   09/30/21 175 lb 4.8 oz (79.5 kg)         Exam:     Temp:  [97.3 °F (36.3 °C)-98.2 °F (36.8 °C)] 98.2 °F (36.8 °C)  Pulse:  [57-71] 57  Resp:  [14-20] 16  BP: ()/(60-94) 120/60  SpO2:  [94 %-100 %] 97 %  General:  Alert, no distress, appears stated age.   Head:  Normocephalic, without obvious abnormality, atraumatic.    Eyes:  Sclera anicteric, No conjunctival pallor, EOMs intact.    Throat: MMM     Neck: Supple, symmetrical, trachea midline    Lungs:   Clear to auscultation bilaterally. Normal effort    Chest wall:  No tenderness or deformity.   Heart:  Regular rate and rhythm, no peripheral edema    Abdomen:   Soft, NT/ND, +bs    MSK: Atraumatic, no cyanosis or edema.    Skin: No visible rashes or lesions.     Neurologic: Normal strength, no focal deficit appreciated              Assessment/Plan:  Patient is a 64 year old female with hx of DM, HTN, HLD, hypothyroidism, GERD here for scheduled surgery.  Pt is s/p LTKA on 8/5.       Plan:    OA sp LTKA on 8/5  - pain meds prn   - IS, PT/OT   - dvt ppx aspirin 81mg BID     DM  - SSI     HTN  - arb, hydrochlorothiazide     HLD  - satin     Hypothyroidism   - synthroid    GERD  - ppi      Prophylaxis:  DVT: aspirin 81mg BID           Ángela Amaya MD

## 2024-08-05 NOTE — PHYSICAL THERAPY NOTE
Reviewed pt's chart and discuss pt with RN. Pt had a s/p 8/5 L TKA. At this time pt continues to have the inability to perform a SLR. PT will re-attempt when appropriate.

## 2024-08-05 NOTE — ANESTHESIA PROCEDURE NOTES
Regional Block    Date/Time: 8/5/2024 8:44 AM    Performed by: Melchor Garcia DO  Authorized by: Melchor Garcia DO      General Information and Staff    Start Time:  8/5/2024 8:44 AM  End Time:  8/5/2024 8:45 AM  Anesthesiologist:  Melchor Garcia DO  Performed by:  Anesthesiologist  Patient Location:  OR    Block Placement: Post Induction  Site Identification: real time ultrasound guided and image stored and retrievable    Block site/laterality marked before start: site marked  Reason for Block: at surgeon's request and post-op pain management    Preanesthetic Checklist: 2 patient identifers, IV checked, site marked, risks and benefits discussed, monitors and equipment checked, pre-op evaluation, timeout performed, anesthesia consent, sterile technique used, no prohibitive neurological deficits and no local skin infection at insertion site      Procedure Details    Patient Position:   Prep: ChloraPrep    Monitoring:  Cardiac monitor, continuous pulse ox and blood pressure cuff  Block Type:  Adductor canal  Laterality:  Left  Injection Technique:  Single-shot    Needle    Needle Type:  Short-bevel and echogenic  Needle Localization:  Ultrasound guidance  Reason for Ultrasound Use: appropriate spread of the medication was noted in real time and no ultrasound evidence of intravascular and/or intraneural injection            Assessment    Injection Assessment:  Good spread noted, negative resistance, negative aspiration for heme, incremental injection and low pressure  Heart Rate Change: No    - Patient tolerated block procedure well without evidence of immediate block related complications.     Medications  8/5/2024 8:44 AM      Additional Comments    Medication:  Bupivacaine 0.5% 20mL with 1:200,000 epi + 50 mcg Clonidine

## 2024-08-05 NOTE — CM/SW NOTE
Department  notified of request for HHC, aidin referrals started. Assigned CM/SW to follow up with pt/family on further discharge planning.       Omayra Rubio, DSC

## 2024-08-05 NOTE — DISCHARGE INSTRUCTIONS
Sometimes managing your health at home requires assistance.  The [facility] team has recognized your preference to use [facility].  They can be reached by phone at [phone].  The fax number for your reference is [phone].  A representative from the home health agency will contact you or your family to schedule your first visit.      Knee Replacement Discharge Instructions    Dear Patient,     [facility] cares about your progress with recovery following your joint replacement surgery.     300 days from your scheduled surgery, [facility] will send you a follow-up survey to help us understand how your surgery impacted your mobility, pain, and overall quality of life. Please make every effort to complete this survey. The information collected from this survey will be used by your physician to track your recovery.     Sincerely,     [facility]      Activity    Bathing  No tub baths, pools, or saunas until cleared by surgeon (about 4-6 weeks because it takes that long for the incision on the skin to heal and be a barrier to prevent infection).  When allowed to shower:    IF AQUACEL - dressing is waterproof and does not require being covered to keep it dry.  Pat dressing and surrounding skin dry after shower              AQUACEL          Gauze dressings are NOT waterproof and REQUIRE being covered with a waterproof barrier to keep the dressing and the incision dry.  SARAN WRAP, GLAD WRAP, and PRESS N SEAL WORK  REALLY WELL BUT ANY PLASTIC WRAP WILL DO.   Do not wash incision.   Remove entire wrapping and old dressing (if Gauze) after showering. Pat dry if necessary WITH A CLEAN TOWEL and cover incision with dry sterile gauze and paper tape. For other types of dressings, follow surgeon’s orders.                                 GAUZE          Driving  Do not drive until cleared by surgeon. This is usually in four to six  weeks after surgery. Discuss at follow-up office visit.   Not allowed while taking narcotic pain medication or muscle relaxants.    Sex  Usually allowed after four to six weeks - check with surgeon at your office visit.    Return to work  Usually allowed after four to six weeks. Discuss specific work activities with your surgeon.    Restrictions  For knee replacement surgery, follow instructions provided by physical therapy.  Do NOT put a pillow under your knee as it may be more difficult to straighten afterwards.    No smoking  Avoid smoking. It is known to cause breathing problems and can decrease the rate of healing.    Incision care/Dressing changes  Wash hands before and after dressing changes.  FOR DRY GAUZE DRESSING:  Change dressing daily using dry sterile gauze and paper tape once Aquacel (waterproof) dressing changed (which is about 7 days after surgery). Continue this until you follow up in the office with your surgeon. Have knee bent when changing dressing for more ease of bending afterwards.  There could be a small amount of redness around the staples or incision; this is normal.  Watch for increased redness, warmth, any odor, increased drainage or opening of the incision. A little clear yellow or blood tinged drainage is normal up to 2 weeks after surgery but it should be less every day until it stops.  Call physician if you notice any concerning changes.  Sutures/staples will be removed at first office visit (10 days- 3 weeks).                          GAUZE                                                   Medication  Anticoagulants = blood thinners (Xarelto, Eliquis, Lovenox, Coumadin, or Aspirin)  Pill or shot form depending on what your physician orders.   IF placed on Coumadin, you may also need lab work done for monitoring.  You will bleed easier and bruise easier while on these medications.   Usually you will be on a blood thinner for about 2-5 weeks depending what physician orders.  Contact  your physician if you have signs of bruising, nose bleeds or blood in your urine. You may consider using electric razors and soft bristle toothbrushes.  Do not take aspirin while taking blood thinners unless ordered by your physician.  Review anticoagulant education information sheet provided.    Discomfort  Surgical discomfort is normal for one to two months.  Have realistic goals and keep a positive outlook.  You may need pain medication regularly (every 4-6 hours) the first 2 weeks and then begin to decrease how often you are taking it.  Take pain medication as prescribed with food, especially before therapy, allowing 30-60 minutes to take effect.  Do not drink alcohol while on pain medication.  Keep pain manageable; pain should not disrupt your sleep or activities like getting out of bed or walking.  As you have less discomfort, decrease the amount of pain medication you take. Use plain Tylenol (acetaminophen) for less severe pain.  Some pain medications have Tylenol (acetaminophen) in them such as Norco and Percocet. Do NOT take Tylenol (acetaminophen) within 4 hours of a dose of these medications.  Apply ice or cold therapy to surgical site for 20 minutes at least four times a day, especially after therapy. Be sure there is a thin cloth barrier between skin and ice or cold therapy.  Change position at least every 45 minutes while awake to avoid stiffness or increased discomfort.  For knee replacements- may elevate your leg by placing a pillow under entire leg. Do not place pillow only under the knee.  Have realistic goals and keep a positive outlook.  Deep breathing and relaxation techniques and distractions can help!  If you focus on something else, you do not experience the pain the same. Take advantage of everything available to your to help control you discomfort.  Contact physician if discomfort does not respond to pain medication.    Body changes  Constipation is common with the use of narcotics.  Eat  fiber rich foods and drink plenty of fluids.  Use stool softeners such as Colace or Senakot while on narcotics, and laxatives such as Miralax or Milk of Magnesia if needed.   An enema or suppository may be needed if above measures do not work.    Prevention of infection and promotion of healing  Good hand washing is important. Everyone should wash their hands or use hand  as soon as they walk in your house-whether they live there or are visiting.  Keep bed linen/clothing freshly laundered.  Do not allow others or pets to touch your incision.  Avoid people that have colds or the flu.  Your surgeon may recommend that you take antibiotics before you undergo any dental or other invasive surgical procedures after your joint replacement. Speak with your physician about this at your post-op office visit.  Eat a balanced diet high in fiber and drink plenty of fluids.   Continue using incentive spirometry because narcotics make you sleepy so you may not take good deep breaths. We do not want you to get pneumonia.     Post op Office visits  Schedule 10 days to 3 weeks after surgery WITH SURGEON’s office.  Additional visits may need to be scheduled. Your physician will discuss this at first post-op office visit.  Schedule outpatient physical therapy per your surgeon’s orders.  Schedule one week follow up after surgery WITH PRIMARY CARE PHYSICIAN; review your medications over last 6 months. Your body gets stressed by surgery and that stress can affect all your other health issues (such as high blood pressure, diabetes, CHF, afib, and asthma just to name a few).  We don’t want those other health issues to cause you to get readmitted to the hospital; much better for you to catch developing problems and prevent them from becoming larger ones.  YULISA HOSE - IF ordered by your surgeon, wear these during the day and off at night.      Notify your surgeon if you notice any  of the following signs  Separation of incision  line.  Increased redness, swelling, or warmth of skin around incision.  Increased or foul smelling drainage from incision  Red streaks on skin near incision.  Temperature >101 F.  Increased pain at incision not relieved by pain medication.      Signs of blood clot  Pain, excessive tenderness, redness, or swelling in leg or calf (other than incision site).  (CALL SURGEON)      Go directly to the ER or CALL 911 if  you:  become short of breath  have chest pain  cough up blood  have unexplained anxiety with breathing  Traveling and Handicapped parking  Check with you surgeon when allowed so you don’t set yourself up for greater chance of complications.  If traveling by car, get out to stretch every 2 hours.  This helps prevent stiffness.  You may need to do this any time you travel for the first year after surgery.  If traveling by plane, BEFORE you get into a security line, let them know that you had your hip replaced, as you will most likely set off the metal detector. The doctors no longer provide an identification card for this as they are easily copied. ALSO request a wheelchair the first year to board and get off a plane…this aids in priority seating and you should sit on the aisle or at the bulkhead where you can easily stretch your legs and get up to walk up and down the aisles…this helps prevent blood clots and stiffness.  TEMPORARY HANDICAP PARKING APPLICATION  (good for 3-6 months)  - At Surgeon or PCP visit, request they fill out the form, then go to [employer] (only time you do not wait in a long line there). Some [facility] offices provide the same service. ([place] [place] and [place] have this service; if you live in another [facility], you may check with them as well). You need space to open car doors to position yourself properly with walker to get in and out of your car safely; some parking spaces are  practically on top of each other and do not give you enough room.    SPECIAL  INSTRUCTIONS:  Luz-  knee replacement (single layer compression sleeve):  All patients should wear the compression sleeves (SPANDA-) until first follow up visit to surgeon’s office ( about 2-3 weeks) and then check with surgeon if need to continue use.  Take off to shower. Best to keep on as much as possible, even at night.  Wash with mild soap and water; DRIP dry overnight.    Directions to put on and off:  IT TAKES 2 PIECES OF SPANDA- (compression sleeves), (USUALLY DIFFERENT SIZES because a calf is usually smaller than a knee.)   Use the longer tube (spanda-/compression sleeve) pulled up over the calf.   This 1st piece (spanda-/compression sleeve) should be on the calf part of the leg, from just below the knee to the ball of the foot to expose the toes.   The 2nd piece (shorter compression sleeve) is pulled over and past the first sleeve onto the knee. The lower edge of the knee portion should overlap the top of the calf spanda- by a few inches. This is the only place where the 2 different pieces overlap.  The top edge of the second spanda- should be about a few inches above the knee but it should NOT be rolling down. The spanda- should be flat so that it does not roll and become too tight.  Makes sure it is NOT bunched up anywhere.   IF the spanda- feels TOO tight, then REMOVE it and call your surgeon to let them know.

## 2024-08-05 NOTE — PLAN OF CARE
POD 0 Lt total knee arthroplasty, Pt is AAOX4, room air, VSS, DTV, nerve block remains in effect, Aquacel dressing remains CDI, Gel ice as needed, PT / OT eval pending, glucose monitored and treated per orders, plan for discharge Tuesday with HH, Pt doing well, all needs met, all safety measures in place, call light within reach, will CTM.

## 2024-08-05 NOTE — INTERVAL H&P NOTE
Pre-op Diagnosis: PRIMARY OSTEOARTHRITIS    The above referenced H&P was reviewed by Maxwell Dorman MD on 8/5/2024, the patient was examined and no significant changes have occurred in the patient's condition since the H&P was performed.  I discussed with the patient and/or legal representative the potential benefits, risks and side effects of this procedure; the likelihood of the patient achieving goals; and potential problems that might occur during recuperation.  I discussed reasonable alternatives to the procedure, including risks, benefits and side effects related to the alternatives and risks related to not receiving this procedure.  We will proceed with procedure as planned.

## 2024-08-05 NOTE — ANESTHESIA POSTPROCEDURE EVALUATION
[facility]    Tosha Dunham Patient Status:  Outpatient in a Bed   Age/Gender 64 year old female MRN [id number]   Location [facility] SURGERY Attending Maxwell Dorman MD   Hosp Day # 0 PCP Cassie Henriquez DO       Anesthesia Post-op Note    LEFT TOTAL KNEE ARTHROPLASTY    Procedure Summary       Date: 08/05/24 Room / Location:  MAIN OR 19 / [facility] MAIN OR    Anesthesia Start: 0823 Anesthesia Stop: 1041    Procedure: LEFT TOTAL KNEE ARTHROPLASTY (Left: Knee) Diagnosis: (PRIMARY OSTEOARTHRITIS)    Surgeons: Maxwell Dorman MD Anesthesiologist: Melchor Garcia DO    Anesthesia Type: spinal ASA Status: 3            Anesthesia Type: spinal    Vitals Value Taken Time   /115 08/05/24 1040   Temp 97.3 08/05/24 1042   Pulse 66 08/05/24 1041   Resp 17 08/05/24 1041   SpO2 99 % 08/05/24 1041   Vitals shown include unfiled device data.    Patient Location: PACU    Anesthesia Type: spinal    Airway Patency: patent    Postop Pain Control: adequate    Mental Status: preanesthetic baseline    Nausea/Vomiting: none    Cardiopulmonary/Hydration status: stable euvolemic    Complications: no apparent anesthesia related complications    Postop vital signs: stable    Dental Exam: Unchanged from Preop    Patient to be discharged from PACU when criteria met.

## 2024-08-06 VITALS
OXYGEN SATURATION: 94 % | WEIGHT: 185 LBS | DIASTOLIC BLOOD PRESSURE: 65 MMHG | TEMPERATURE: 99 F | HEART RATE: 57 BPM | SYSTOLIC BLOOD PRESSURE: 125 MMHG | RESPIRATION RATE: 18 BRPM | HEIGHT: 66 IN | BODY MASS INDEX: 29.73 KG/M2

## 2024-08-06 LAB
ANION GAP SERPL CALC-SCNC: 4 MMOL/L (ref 0–18)
BASOPHILS # BLD AUTO: 0.04 X10(3) UL (ref 0–0.2)
BASOPHILS NFR BLD AUTO: 0.4 %
BUN BLD-MCNC: 15 MG/DL (ref 9–23)
CALCIUM BLD-MCNC: 9 MG/DL (ref 8.7–10.4)
CHLORIDE SERPL-SCNC: 106 MMOL/L (ref 98–112)
CO2 SERPL-SCNC: 28 MMOL/L (ref 21–32)
CREAT BLD-MCNC: 0.81 MG/DL
EGFRCR SERPLBLD CKD-EPI 2021: 81 ML/MIN/1.73M2 (ref 60–?)
EOSINOPHIL # BLD AUTO: 0.05 X10(3) UL (ref 0–0.7)
EOSINOPHIL NFR BLD AUTO: 0.5 %
ERYTHROCYTE [DISTWIDTH] IN BLOOD BY AUTOMATED COUNT: 15.7 %
GLUCOSE BLD-MCNC: 181 MG/DL (ref 70–99)
GLUCOSE BLD-MCNC: 201 MG/DL (ref 70–99)
GLUCOSE BLD-MCNC: 225 MG/DL (ref 70–99)
HCT VFR BLD AUTO: 28.4 %
HGB BLD-MCNC: 9 G/DL
IMM GRANULOCYTES # BLD AUTO: 0.06 X10(3) UL (ref 0–1)
IMM GRANULOCYTES NFR BLD: 0.6 %
LYMPHOCYTES # BLD AUTO: 1.64 X10(3) UL (ref 1–4)
LYMPHOCYTES NFR BLD AUTO: 15.1 %
MCH RBC QN AUTO: 26.5 PG (ref 26–34)
MCHC RBC AUTO-ENTMCNC: 31.7 G/DL (ref 31–37)
MCV RBC AUTO: 83.5 FL
MONOCYTES # BLD AUTO: 0.94 X10(3) UL (ref 0.1–1)
MONOCYTES NFR BLD AUTO: 8.6 %
NEUTROPHILS # BLD AUTO: 8.15 X10 (3) UL (ref 1.5–7.7)
NEUTROPHILS # BLD AUTO: 8.15 X10(3) UL (ref 1.5–7.7)
NEUTROPHILS NFR BLD AUTO: 74.8 %
OSMOLALITY SERPL CALC.SUM OF ELEC: 293 MOSM/KG (ref 275–295)
PLATELET # BLD AUTO: 229 10(3)UL (ref 150–450)
POTASSIUM SERPL-SCNC: 4 MMOL/L (ref 3.5–5.1)
RBC # BLD AUTO: 3.4 X10(6)UL
SODIUM SERPL-SCNC: 138 MMOL/L (ref 136–145)
WBC # BLD AUTO: 10.9 X10(3) UL (ref 4–11)

## 2024-08-06 PROCEDURE — 80048 BASIC METABOLIC PNL TOTAL CA: CPT | Performed by: INTERNAL MEDICINE

## 2024-08-06 PROCEDURE — 85025 COMPLETE CBC W/AUTO DIFF WBC: CPT | Performed by: INTERNAL MEDICINE

## 2024-08-06 PROCEDURE — 97530 THERAPEUTIC ACTIVITIES: CPT

## 2024-08-06 PROCEDURE — 97535 SELF CARE MNGMENT TRAINING: CPT

## 2024-08-06 PROCEDURE — 82962 GLUCOSE BLOOD TEST: CPT

## 2024-08-06 PROCEDURE — 97161 PT EVAL LOW COMPLEX 20 MIN: CPT

## 2024-08-06 PROCEDURE — 97116 GAIT TRAINING THERAPY: CPT

## 2024-08-06 PROCEDURE — 97165 OT EVAL LOW COMPLEX 30 MIN: CPT

## 2024-08-06 RX ORDER — ACETAMINOPHEN 325 MG/1
650 TABLET ORAL EVERY 4 HOURS
Status: SHIPPED | COMMUNITY
Start: 2024-08-06

## 2024-08-06 RX ORDER — TRAMADOL HYDROCHLORIDE 50 MG/1
50 TABLET ORAL
Status: SHIPPED | COMMUNITY
Start: 2024-08-06

## 2024-08-06 NOTE — CM/SW NOTE
Met with patient and her friend Sneha ([phone]) at bedside and provided A[facility] information for [facility] (aka [facility]).  Pt agreeable with DC plan.  Pt's friend Sneha requested as primary contact.  Updated [facility] with her phone number.  No further DC needs/concerns identified at this time.  SW available should additional discharge needs arise.    Zulay Malik, [facility]  Discharge Planner  [phone]

## 2024-08-06 NOTE — PROGRESS NOTES
Progress Note    Patient: Tosha Dunham  Medical record #: [id number]    Tosha Dunham is a 64 year old  female who is POD #1 left TKA.  The patient's main complaint today is surgical pain at the operative site. No numbness or tingling. No chest pain or shortness of breath.    Hospital Problem List:  Active Problems:    * No active hospital problems. *      Current Medications:   lactated ringers infusion   Intravenous Continuous    [COMPLETED] tranexamic acid in sodium chloride 0.7% (Cyklokapron) 1000 mg/100mL infusion premix 1,000 mg  1,000 mg Intravenous Once    [COMPLETED] ceFAZolin (Ancef) 2g in 10mL IV syringe premix  2 g Intravenous Once    sodium chloride 0.9 % IV bolus 500 mL  500 mL Intravenous Once PRN    sennosides (Senokot) tab 17.2 mg  17.2 mg Oral Nightly    docusate sodium (Colace) cap 100 mg  100 mg Oral BID    polyethylene glycol (PEG 3350) (Miralax) 17 g oral packet 17 g  17 g Oral Daily PRN    magnesium hydroxide (Milk of Magnesia) 400 MG/5ML oral suspension 30 mL  30 mL Oral Daily PRN    bisacodyl (Dulcolax) 10 MG rectal suppository 10 mg  10 mg Rectal Daily PRN    fleet enema (Fleet) 7-19 GM/118ML rectal enema 133 mL  1 enema Rectal Once PRN    famotidine (Pepcid) tab 20 mg  20 mg Oral BID    Or    famotidine (Pepcid) 20 mg/2mL injection 20 mg  20 mg Intravenous BID    ondansetron (Zofran) 4 MG/2ML injection 4 mg  4 mg Intravenous Q6H PRN    prochlorperazine (Compazine) 10 MG/2ML injection 5 mg  5 mg Intravenous Q8H PRN    [] diphenhydrAMINE (Benadryl) 50 mg/mL  injection 25 mg  25 mg Intravenous Once PRN    diphenhydrAMINE (Benadryl) cap/tab 25 mg  25 mg Oral Q4H PRN    Or    diphenhydrAMINE (Benadryl) 50 mg/mL  injection 12.5 mg  12.5 mg Intravenous Q4H PRN    ferrous sulfate DR tab 325 mg  325 mg Oral Daily with breakfast    aspirin DR tab 81 mg  81 mg Oral BID    [COMPLETED] ceFAZolin (Ancef) 2g in 10mL IV syringe premix  2 g Intravenous Q8H    [COMPLETED] acetaminophen  (Tylenol) tab 650 mg  650 mg Oral Once    tiZANidine (Zanaflex) tab 2 mg  2 mg Oral Q6H PRN    dexAMETHasone PF (Decadron) 10 MG/ML injection 8 mg  8 mg Intravenous Once    ketorolac (Toradol) 30 MG/ML injection 15 mg  15 mg Intravenous Q6H    Followed by    ibuprofen (Motrin) tab 600 mg  600 mg Oral 4 times per day    oxyCODONE immediate release tab 5 mg  5 mg Oral Q4H PRN    Or    oxyCODONE immediate release tab 10 mg  10 mg Oral Q4H PRN    HYDROmorphone (Dilaudid) 1 MG/ML injection 0.4 mg  0.4 mg Intravenous Q2H PRN    Or    HYDROmorphone (Dilaudid) 1 MG/ML injection 0.8 mg  0.8 mg Intravenous Q2H PRN    [COMPLETED] insulin aspart (NovoLOG) 100 Units/mL vial 2-10 Units  2-10 Units Subcutaneous Once    glucose (Dex4) 15 GM/59ML oral liquid 15 g  15 g Oral Q15 Min PRN    Or    glucose (Glutose) 40% oral gel 15 g  15 g Oral Q15 Min PRN    Or    glucose-vitamin C (Dex-4) chewable tab 4 tablet  4 tablet Oral Q15 Min PRN    Or    dextrose 50% injection 50 mL  50 mL Intravenous Q15 Min PRN    Or    glucose (Dex4) 15 GM/59ML oral liquid 30 g  30 g Oral Q15 Min PRN    Or    glucose (Glutose) 40% oral gel 30 g  30 g Oral Q15 Min PRN    Or    glucose-vitamin C (Dex-4) chewable tab 8 tablet  8 tablet Oral Q15 Min PRN    insulin aspart (NovoLOG) 100 Units/mL FlexPen 1-5 Units  1-5 Units Subcutaneous TID AC and HS    atorvastatin (Lipitor) tab 40 mg  40 mg Oral Daily    escitalopram (Lexapro) tab 20 mg  20 mg Oral Daily    gabapentin (Neurontin) cap 100 mg  100 mg Oral Nightly    levothyroxine (Synthroid) tab 300 mcg  300 mcg Oral Before breakfast    losartan (Cozaar) 100 mg, hydroCHLOROthiazide 12.5 mg for Hyzaar 100/12.5 ([facility] only)   Oral Daily    montelukast (Singulair) tab 10 mg  10 mg Oral Daily    pantoprazole (Protonix) DR tab 40 mg  40 mg Oral Daily    [COMPLETED] povidone-iodine (Betadine) 10 % 17.5 mL in sodium chloride 0.9 % 500 mL irrigation   Irrigation Once (Intra-Op)    [COMPLETED]  clonidine/epinephrine/ropivacaine/ketorolac in 0.9% NaCl 60 mL pain cocktail syringe for knee arthroplasty   Infiltration Once (Intra-Op)         Input/Output:    Intake/Output Summary (Last 24 hours) at 8/6/2024 0727  Last data filed at 8/6/2024 0638  Gross per 24 hour   Intake 1920 ml   Output 1150 ml   Net 770 ml       Vital signs:  Blood pressure 107/49, pulse 50, temperature 97.9 °F (36.6 °C), temperature source Oral, resp. rate 20, height 5' 6\" (1.676 m), weight 185 lb (83.9 kg), last menstrual period 05/06/2007, SpO2 94%, not currently breastfeeding.    Physical Exam:     Left Knee:  Dressing: clean and dry  Able to dorsiflex ankle and move toes  Sensation grossly intact to light touch throughout  Distal pulses intact  No calf swelling  Ce's sign negative  Compartments soft  No signs or symptoms of DVT or compartment syndrome    Labs:   Lab Results   Component Value Date    WBC 10.9 08/06/2024    HGB 9.0 08/06/2024    HCT 28.4 08/06/2024    .0 08/06/2024    CREATSERUM 0.81 08/06/2024    BUN 15 08/06/2024     08/06/2024    K 4.0 08/06/2024     08/06/2024    CO2 28.0 08/06/2024     08/06/2024    CA 9.0 08/06/2024         Assessment: 64 year old  female POD #1 Left TKA    Plan:  Hgb - 9.0 - acute blood loss anemia, consistent with post op changes, stable/asymptomatic  mobilize with PT, WBAT on operative knee with walker   [place]-op Glucose Goal control <140  pain controlled   Ice and elevation  TEDs, SCDS, IS  ASA 81 mg BID for 6 weeks according to CHEST guidelines and bleeding risks.    Disposition: plan discharge home POD #1 or 2 if doing well with PT; if slow to progress consider transfer to rehab   Leave Dressing in place for one week once aquacel in place.  OK to switch dressing for >50% saturated.  Ok to shower with it on.    Follow up with Dr. Dorman - 2-3 weeks after discharge home or 2-3 weeks after discharge from rehab  Appreciate medical team's expertise and help in  managing the patient's medical co-morbidities    Followed by [facility] for medical conditions to include Active Problems:    * No active hospital problems. *        Signed:  LANE Suarez  8/6/2024  7:27 AM

## 2024-08-06 NOTE — PHYSICAL THERAPY NOTE
PHYSICAL THERAPY EVALUATION - INPATIENT     Room Number: 366/366-A  Evaluation Date: 2024  Type of Evaluation: Initial  Physician Order: PT Eval and Treat    Presenting Problem: s/p L TKA 24  Co-Morbidities : vertigo, HTN, HLD  Reason for Therapy: Mobility Dysfunction and Discharge Planning    PHYSICAL THERAPY ASSESSMENT   Patient is a 64 year old female admitted 2024 for elective L TKA.   Patient is currently functioning near baseline with bed mobility, transfers, gait, and stair negotiation. Prior to admission, patient's baseline is MOD I.     Patient will benefit from continued skilled PT Services at discharge to promote prior level of function.  Anticipate patient will return home with home health PT.    PLAN  Patient has been evaluated and presents with no skilled Physical Therapy needs at this time.  Patient discharged from Physical Therapy services.  Please re-order if a new functional limitation presents during this admission.    GOALS  Patient was able to achieve the following goals ...    Patient was able to transfer Safely and independently   Patient able to ambulate on level surfaces Safely and independently         HOME SITUATION  Type of Home: House   Home Layout: One level  Stairs to Enter : 1             Lives With: Alone  Drives: Yes  Patient Owned Equipment: Rolling walker  Patient Regularly Uses: Glasses    Prior Level of [place]: Pt lives alone in single story home. Pts friend staying with her at [place]. Pt independent with ADL and mobility. Pt does not use RW or cane.     SUBJECTIVE  \"I don't have much pain.\"       OBJECTIVE  Precautions: None  Fall Risk: Standard fall risk    WEIGHT BEARING RESTRICTION  Weight Bearing Restriction: L lower extremity           L Lower Extremity: Weight Bearing as Tolerated    PAIN ASSESSMENT  Ratin  Location: L knee  Management Techniques: Activity promotion;Repositioning    COGNITION  Overall Cognitive Status:  WFL - within functional  limits    RANGE OF MOTION AND STRENGTH ASSESSMENT  Upper extremity ROM and strength are within functional limits     Lower extremity ROM is within functional limits   Except  R knee flex 75 degrees    Lower extremity strength is within functional limits       BALANCE  Static Sitting: Good  Dynamic Sitting: Good  Static Standing: Fair -  Dynamic Standing: Fair -    ADDITIONAL TESTS                                    ACTIVITY TOLERANCE                         O2 WALK       NEUROLOGICAL FINDINGS                        AM-PAC '6-Clicks' INPATIENT SHORT FORM - BASIC MOBILITY  How much difficulty does the patient currently have...  Patient Difficulty: Turning over in bed (including adjusting bedclothes, sheets and blankets)?: A Little   Patient Difficulty: Sitting down on and standing up from a chair with arms (e.g., wheelchair, bedside commode, etc.): A Little   Patient Difficulty: Moving from lying on back to sitting on the side of the bed?: A Little   How much help from another person does the patient currently need...   Help from Another: Moving to and from a bed to a chair (including a wheelchair)?: A Little   Help from Another: Need to walk in hospital room?: A Little   Help from Another: Climbing 3-5 steps with a railing?: A Little       AM-PAC Score:  Raw Score: 18   Approx Degree of Impairment: 46.58%   Standardized Score (AM-PAC Scale): 43.63   CMS Modifier (G-Code): CK    FUNCTIONAL ABILITY STATUS  Gait Assessment   Functional Mobility/Gait Assessment  Gait Assistance: Supervision  Distance (ft): 150  Assistive Device: Rolling walker  Stairs: Stoop/curb  Stoop/Curb: supervision    Skilled Therapy Provided   Supine-sit with supervision.   VC for transfer set up.   Sit-stand with RW and supervision.   Ambulatory with step to gait pattern progressing to step through gait pattern.   VC for sequencing with RW.   Ascended/descended stoop step safely with supervision.   VC for sequencing.   Returned to room up in  bedside chair.     Bed Mobility:  Rolling: supervision  Supine to sit: supervision   Sit to supine: NT     Transfer Mobility:  Sit to stand: supervision   Stand to sit: supervision  Gait = supervision    Therapist's comments: Reviewed activity recommendations.     Exercise/Education Provided:  Bed mobility  Energy conservation  Functional activity tolerated  Gait training  Posture  ROM  Strengthening  Transfer training    Patient End of Session: Up in chair;Needs met;Call light within reach;RN aware of session/findings;All patient questions and concerns addressed;SCDs in place;Ice applied;Alarm set    Patient Evaluation Complexity Level:  History Moderate - 1 or 2 personal factors and/or co-morbidities   Examination of body systems Low -  addressing 1-2 elements   Clinical Presentation Low- Stable   Clinical Decision Making Low Complexity       PT Session Time: 25 minutes  Gait Training: 10 minutes  Therapeutic Activity:  minutes  Neuromuscular Re-education:  minutes  Therapeutic Exercise:  minutes

## 2024-08-06 NOTE — PLAN OF CARE
Patient is alert and oriented x 4. Vitals stable on room air. Pain managed by scheduled meds. Denies numbness & tingling. Aquacel c/d/I. Up minimal assist with walker. Tolerating regular diet. Plan for PT/OT. Plan of care discussed with patient.

## 2024-08-06 NOTE — PLAN OF CARE
Alert and oriented x 4. Vitals stable. Educated on use of incentive spirometry 10 times/hour while awake. Left knee pain controlled. Aquacel clean and dry to left knee, spandagrip and gel ice packs in place. Voiding without difficulty. Tolerating regular diet. Up with PT/OT. Plan for discharge home today. Plan of care discussed with patient and friend.

## 2024-08-06 NOTE — PROGRESS NOTES
[facility] Hospitalist Progress Note     CC: Hospital Follow up    PCP: Cassie Henriquez DO       Assessment/Plan:     Active Problems:    * No active hospital problems. *          Assessment/Plan:  Patient is a 64 year old female with hx of DM, HTN, HLD, hypothyroidism, GERD here for scheduled surgery.  Pt is s/p LTKA on 8/5.        Plan:    OA sp LTKA on 8/5  - pain meds prn   - IS, PT/OT   - dvt ppx aspirin 81mg BID      DM  - SSI      HTN  - arb, hydrochlorothiazide      HLD  - satin      Hypothyroidism   - synthroid     GERD  - ppi      Prophylaxis:  DVT: aspirin 81mg BID   Ok to dc from hospitalist standpoint.     Carla Abrams DO  Hospitalist  [facility]       Subjective:     No CP, SOB, or N/V. Feels better    OBJECTIVE:    Blood pressure 128/51, pulse 56, temperature 98.5 °F (36.9 °C), temperature source Oral, resp. rate 18, height 5' 6\" (1.676 m), weight 185 lb (83.9 kg), last menstrual period 05/06/2007, SpO2 95%, not currently breastfeeding.    Temp:  [97.9 °F (36.6 °C)-98.5 °F (36.9 °C)] 98.5 °F (36.9 °C)  Pulse:  [50-68] 56  Resp:  [16-20] 18  BP: (102-128)/(49-71) 128/51  SpO2:  [90 %-97 %] 95 %      Intake/Output:    Intake/Output Summary (Last 24 hours) at 8/6/2024 1156  Last data filed at 8/6/2024 0730  Gross per 24 hour   Intake 1050 ml   Output 1000 ml   Net 50 ml       Last 3 Weights   08/05/24 0636 185 lb (83.9 kg)   07/10/24 1345 185 lb (83.9 kg)   11/11/21 1826 176 lb (79.8 kg)   09/30/21 1045 175 lb 4.8 oz (79.5 kg)       Exam   Gen: No acute distress, alert and oriented x3, no focal neurologic deficits  Heent: NC AT, mucous memb clear, neck supple  Pulm: Lungs clear, normal respiratory effort  CV: Heart with regular rate and rhythm, no peripheral edema  Abd: Abdomen soft, nontender, nondistended, bowel sounds present  MSK: expected rom, no clubbing, no cyanosis  Skin: no rashes or lesions  Neuro: AO*3, motor intact, no sensory deficits  Psyc: appropriate mood and affect      Data  Review:       Labs:     Recent Labs   Lab 08/06/24  0458   RBC 3.40*   HGB 9.0*   HCT 28.4*   MCV 83.5   MCH 26.5   MCHC 31.7   RDW 15.7   NEPRELIM 8.15*   WBC 10.9   .0         Recent Labs   Lab 08/06/24  0458   *   BUN 15   CREATSERUM 0.81   EGFRCR 81   CA 9.0      K 4.0      CO2 28.0       No results for input(s): \"ALT\", \"AST\", \"ALB\", \"AMYLASE\", \"LIPASE\", \"LDH\" in the last 168 hours.    Invalid input(s): \"ALPHOS\", \"TBIL\", \"DBIL\", \"TPROT\"      Imaging:  XR KNEE (1 OR 2 VIEWS), LEFT (CPT=73560)    Result Date: 8/5/2024  CONCLUSION:  There are expected postoperative changes in left knee.   LOCATION:  [facility]   Dictated by (CST): Han Zepeda MD on 8/05/2024 at 1:19 PM     Finalized by (CST): Han Zepeda MD on 8/05/2024 at 1:20 PM          Meds:      sennosides  17.2 mg Oral Nightly    docusate sodium  100 mg Oral BID    famotidine  20 mg Oral BID    Or    famotidine  20 mg Intravenous BID    ferrous sulfate  325 mg Oral Daily with breakfast    aspirin  81 mg Oral BID    ibuprofen  600 mg Oral 4 times per day    insulin aspart  1-5 Units Subcutaneous TID AC and HS    atorvastatin  40 mg Oral Daily    escitalopram  20 mg Oral Daily    gabapentin  100 mg Oral Nightly    levothyroxine  300 mcg Oral Before breakfast    losartan (Cozaar) 100 mg, hydroCHLOROthiazide 12.5 mg for Hyzaar 100/12.5 ([facility] only)   Oral Daily    montelukast  10 mg Oral Daily    pantoprazole  40 mg Oral Daily      lactated ringers Stopped (08/05/24 1041)       sodium chloride    polyethylene glycol (PEG 3350)    magnesium hydroxide    bisacodyl    fleet enema    ondansetron    prochlorperazine    diphenhydrAMINE **OR** diphenhydrAMINE    tiZANidine    oxyCODONE **OR** oxyCODONE    HYDROmorphone **OR** HYDROmorphone    glucose **OR** glucose **OR** glucose-vitamin C **OR** dextrose **OR** glucose **OR** glucose **OR** glucose-vitamin C

## 2024-08-06 NOTE — OCCUPATIONAL THERAPY NOTE
OCCUPATIONAL THERAPY EVALUATION - INPATIENT    Room Number: 366/366-A  Evaluation Date: 8/6/2024     Type of Evaluation: Initial  Presenting Problem: s/p Left total knee arthroplasty 8/5/24    Physician Order: IP Consult to Occupational Therapy  Reason for Therapy:  ADL/IADL Dysfunction and Discharge Planning      OCCUPATIONAL THERAPY ASSESSMENT   Patient is a 64 year old female admitted on 8/5/2024 with Presenting Problem: s/p Left total knee arthroplasty 8/5/24. Co-Morbidities : vertigo, HTN, HLD  Patient is currently functioning near baseline with toileting, upper body dressing, lower body dressing, grooming, bed mobility, transfers, static sitting balance, dynamic sitting balance, static standing balance, dynamic standing balance, maintaining seated position, functional standing tolerance, energy conservation strategies, and aerobic capacity.  Prior to admission, patient's baseline is IND c ADLs/IADLs. Pt is working full time.  Patient met all OT goals at supervision level.  Patient reports no further questions/concerns at this time.     Patient will be discharged from acute OT services at this time.    WEIGHT BEARING RESTRICTION  Weight Bearing Restriction: L lower extremity           L Lower Extremity: Weight Bearing as Tolerated    Recommendations for nursing staff:   Transfers: up c x1 person, RW, supervision  Toileting location: Toilet    EVALUATION SESSION:  Patient at start of session: Semi supine in bed.  FUNCTIONAL TRANSFER ASSESSMENT  Sit to Stand: Edge of Bed  Edge of Bed: Supervision  Toilet Transfer: Supervision    BED MOBILITY  Supine to Sit : Supervision  Scooting: Supervision    BALANCE ASSESSMENT  Static Sitting: Supervision  Sitting Bilateral: Supervision  Static Standing: Supervision  Standing Bilateral: Supervision    FUNCTIONAL ADL ASSESSMENT  Grooming Standing: Supervision  UB Dressing Seated: Supervision  LB Dressing Seated: Supervision  LB Dressing Standing: Supervision  Toileting  Seated: Supervision      ACTIVITY TOLERANCE: Pt completed bed mobility, seated/standing dressing task, sit <> stand transfer from EOB to ambulate to bathroom to complete toilet transfer and standing grooming task with supervision. Pt experienced slight buckling in L knee when standing from EOB; pt able to shift weight onto R side and utilized RW when ambulating. Pt reported a pain rating of 2/10 at beginning of session.                         O2 SATURATIONS       COGNITION  Overall Cognitive Status:  WFL - within functional limits  COGNITION ASSESSMENTS       Upper Extremity:   ROM: within functional limits  Strength: is within functional limits  Coordination:  Gross motor: WFL  Fine motor: WFL  Sensation: Light touch:  intact    EDUCATION PROVIDED  Patient: Role of Occupational Therapy; Plan of Care; DME Recommendations; Adaptive Equipment Recommendations; Functional Transfer Techniques; Fall Prevention; Weight Bear Status; Surgical Precautions; Posture/Positioning; Energy Conservation; Compensatory ADL Techniques; Proper Body Mechanics  Patient's Response to Education: Verbalized Understanding; Returned Demonstration  Family/Caregiver: -- (Same)  Family/Caregiver's Response to Education: Verbalized Understanding    Equipment used: RW  Demonstrates functional use    Therapist comments: Pt pleasant and motivated throughout session today. Pt completed bed mobility to sit EOB to prepare for seated dressing with supervision. Pt completed UB & LB dressing in seated/standing with supervision and increased time to don underwear/pants over LLE. Pt completed sit <> stand transfer from EOB with supervision to prepare for ambulation into bathroom; pt experienced slight buckling in LLE, pt able to shift weight to R side and utilized RW for support during ambulation. Pt completed toilet transfer and standing grooming task with supervision; pt demonstrated good standing balance when completing standing grooming task. Pt  educated on obtaining DME to promote safe completion of ADLs once home; pt verbalized understanding. Pt returned to chair in room and educated on proper positioning of LLE and ice use PRN when resting and no pillows directly under L knee; pt verbalized understanding. Pt friend/family member present throughout session and reports she is able to assist pt when needed. Pt received AE handout at end of session.    Patient End of Session: Up in chair;Needs met;Call light within reach;RN aware of session/findings;All patient questions and concerns addressed;SCDs in place;Family present    OCCUPATIONAL PROFILE    HOME SITUATION  Type of Home: House  Home Layout: One level  Lives With: Alone    Toilet and Equipment: Comfort height toilet;Standard height toilet (safety frame)  Shower/Tub and Equipment: Walk-in shower;Grab bar  Other Equipment: Long-handled shoehorn (RW)    Occupation/Status: Working full time  Hand Dominance: Left  Drives: Yes  Patient Regularly Uses: Glasses    Prior Level of Function: previously IND c ADLs/IADLs. Pt is working full time and lives at home with friend.    SUBJECTIVE  \"We have 2 cats at home\"    PAIN ASSESSMENT  Ratin  Location: L knee  Management Techniques: Ice;Repositioning;Body mechanics;Activity promotion    OBJECTIVE  Precautions: None  Fall Risk: Standard fall risk    WEIGHT BEARING RESTRICTION  Weight Bearing Restriction: L lower extremity           L Lower Extremity: Weight Bearing as Tolerated    AM-PAC ‘6-Clicks’ Inpatient Daily Activity Short Form  -   Putting on and taking off regular lower body clothing?: None  -   Bathing (including washing, rinsing, drying)?: A Little  -   Toileting, which includes using toilet, bedpan or urinal? : None  -   Putting on and taking off regular upper body clothing?: None  -   Taking care of personal grooming such as brushing teeth?: None  -   Eating meals?: None    AM-PAC Score:  Score: 23  Approx Degree of Impairment: 15.86%  Standardized  Score (AM-PAC Scale): 51.12      ADDITIONAL TESTS     NEUROLOGICAL FINDINGS        PLAN   Patient has been evaluated and presents with no skilled Occupational Therapy needs at this time.  Patient discharged from Occupational Therapy services.  Please re-order if a new functional limitation presents during this admission.      Patient Evaluation Complexity Level:   Occupational Profile/Medical History LOW - Brief history including review of medical or therapy records    Specific performance deficits impacting engagement in ADL/IADL LOW  1 - 3 performance deficits    Client Assessment/Performance Deficits MODERATE - Comorbidities and min to mod modifications of tasks    Clinical Decision Making LOW - Analysis of occupational profile, problem-focused assessments, limited treatment options    Overall Complexity LOW     OT Session Time: 30 minutes  Self-Care Home Management: 20 minutes  Therapeutic Activity: 8 minutes

## (undated) DEVICE — SPONGE 4X4 10PK

## (undated) DEVICE — SHEET,DRAPE,70X100,STERILE: Brand: MEDLINE

## (undated) DEVICE — HOOD: Brand: FLYTE

## (undated) DEVICE — GLOVE SUR 6.5 SENSICARE PI PIP GRN PWD F

## (undated) DEVICE — TOTAL KNEE CDS: Brand: MEDLINE INDUSTRIES, INC.

## (undated) DEVICE — SYSTEM VAC MIX SGL DBL CLEARMIX 10 PER CA

## (undated) DEVICE — WRAP COMPR UNIV KNEE HOT CLD GEL MICWV AND

## (undated) DEVICE — DRAPE,U/SHT,SPLIT,FILM,60X84,STERILE: Brand: MEDLINE

## (undated) DEVICE — STOCKINETTE,IMPERVIOUS,12X48,STERILE: Brand: MEDLINE

## (undated) DEVICE — SLEEVE COMPR MD KNEE LEN SGL USE KENDALL SCD

## (undated) DEVICE — HOOD, PEEL-AWAY: Brand: FLYTE

## (undated) DEVICE — BANDAGE COBAN 4IN X 5YD TAN E POR SLF ADH COBAN

## (undated) DEVICE — Device: Brand: STABLECUT®

## (undated) DEVICE — APPLICATOR PREP 26ML CHG 2% ISO ALC 70%

## (undated) DEVICE — NEEDLE SPNL 18GA L3.5IN PNK QNCKE STYL DISP

## (undated) DEVICE — GLOVE SUR 6.5 SENSICARE PI PIP CRM PWD F

## (undated) DEVICE — COVER LT HNDL RIG FOR SUR CAM DISP

## (undated) DEVICE — SUT COAT VCRL 0 27IN CT-1 ABSRB UD 36MM 1/2

## (undated) DEVICE — SUT MCRYL 3-0 27IN ABSRB UD 19MM PS-2 3/8

## (undated) DEVICE — GLOVE SUR 7 SENSICARE PI PIP CRM PWD F

## (undated) DEVICE — ZZ- DISC- NOSUB-SOLUTION RUBBING 4OZ 70% ISO ALC CLR

## (undated) DEVICE — SOLUTION IRRIG 3000ML 0.9% NACL FLX CONT

## (undated) DEVICE — PENCIL ES BTTN SWCH W/ TIP HOLSTER E-Z CLN

## (undated) DEVICE — VIOLET BRAIDED (POLYGLACTIN 910), SYNTHETIC ABSORBABLE SUTURE: Brand: COATED VICRYL

## (undated) DEVICE — ADHESIVE SKIN TOP FOR WND CLSR DERMBND ADV

## (undated) DEVICE — MEDI-VAC NON-CONDUCTIVE SUCTION TUBING: Brand: CARDINAL HEALTH

## (undated) DEVICE — 2T11 #2 PDO 36 X 36: Brand: 2T11 #2 PDO 36 X 36

## (undated) DEVICE — CONTAINER,SPECIMEN,PNEU TUBE,4OZ,OR STRL: Brand: MEDLINE

## (undated) DEVICE — UNIVERSAL STERIBUMP® STERILE (5/CASE): Brand: UNIVERSAL STERIBUMP®

## (undated) DEVICE — SYRINGE MED 30ML STD CLR PLAS LL TIP N CTRL

## (undated) DEVICE — SUT STRATAFIX SPRL MCRYL+ 2-0 27IN CT-1 ABSRB

## (undated) DEVICE — BLADE RMR 29MM PAT SS W/ PILOT H

## (undated) DEVICE — GLOVE SUR 7.5 SENSICARE PI PIP GRN PWD F

## (undated) DEVICE — BIPOLAR SEALER 23-112-1 AQM 6.0: Brand: AQUAMANTYS™

## (undated) NOTE — LETTER
OUTSIDE TESTING RESULT REQUEST     IMPORTANT: FOR YOUR IMMEDIATE ATTENTION  Please FAX all test results listed below to: [phone]       * * * * If testing is NOT complete, arrange with patient A.S.A.P. * * * *      Patient Name: Tosha Dunham  Surgery Date: 2024  Medical Record: [id number]  CSN: [id number]  : 1960 - A: 64 y     Sex: female  Surgeon(s):  Maxwell Dorman MD  Procedure: LEFT TOTAL KNEE ARTHROPLASTY  Anesthesia Type: Spinal     Surgeon: Maxwell Dorman MD     The following Testing and Time Line are REQUIRED PER ANESTHESIA     EKG READ AND SIGNED WITHIN   90 days      Thank You,   Sent by:ANGIE LIN